# Patient Record
Sex: MALE | Race: ASIAN | NOT HISPANIC OR LATINO | URBAN - METROPOLITAN AREA
[De-identification: names, ages, dates, MRNs, and addresses within clinical notes are randomized per-mention and may not be internally consistent; named-entity substitution may affect disease eponyms.]

---

## 2018-02-12 ENCOUNTER — INPATIENT (INPATIENT)
Facility: HOSPITAL | Age: 43
LOS: 2 days | Discharge: HOME | DRG: 866 | End: 2018-02-15
Attending: HOSPITALIST | Admitting: HOSPITALIST
Payer: COMMERCIAL

## 2018-02-12 VITALS
RESPIRATION RATE: 18 BRPM | OXYGEN SATURATION: 100 % | HEART RATE: 118 BPM | DIASTOLIC BLOOD PRESSURE: 79 MMHG | SYSTOLIC BLOOD PRESSURE: 117 MMHG | TEMPERATURE: 100 F

## 2018-02-12 DIAGNOSIS — D69.6 THROMBOCYTOPENIA, UNSPECIFIED: ICD-10-CM

## 2018-02-12 DIAGNOSIS — R11.2 NAUSEA WITH VOMITING, UNSPECIFIED: ICD-10-CM

## 2018-02-12 DIAGNOSIS — R50.9 FEVER, UNSPECIFIED: ICD-10-CM

## 2018-02-12 DIAGNOSIS — Z29.9 ENCOUNTER FOR PROPHYLACTIC MEASURES, UNSPECIFIED: ICD-10-CM

## 2018-02-12 DIAGNOSIS — R79.89 OTHER SPECIFIED ABNORMAL FINDINGS OF BLOOD CHEMISTRY: ICD-10-CM

## 2018-02-12 DIAGNOSIS — R74.0 NONSPECIFIC ELEVATION OF LEVELS OF TRANSAMINASE AND LACTIC ACID DEHYDROGENASE [LDH]: ICD-10-CM

## 2018-02-12 DIAGNOSIS — E87.1 HYPO-OSMOLALITY AND HYPONATREMIA: ICD-10-CM

## 2018-02-12 DIAGNOSIS — R19.8 OTHER SPECIFIED SYMPTOMS AND SIGNS INVOLVING THE DIGESTIVE SYSTEM AND ABDOMEN: Chronic | ICD-10-CM

## 2018-02-12 DIAGNOSIS — R79.9 ABNORMAL FINDING OF BLOOD CHEMISTRY, UNSPECIFIED: ICD-10-CM

## 2018-02-12 LAB
24R-OH-CALCIDIOL SERPL-MCNC: 19.6 NG/ML — LOW (ref 30–80)
ALBUMIN SERPL ELPH-MCNC: 3.7 G/DL — SIGNIFICANT CHANGE UP (ref 3.3–5)
ALP SERPL-CCNC: 108 U/L — SIGNIFICANT CHANGE UP (ref 40–120)
ALT FLD-CCNC: 117 U/L RC — HIGH (ref 10–45)
ANION GAP SERPL CALC-SCNC: 14 MMOL/L — SIGNIFICANT CHANGE UP (ref 5–17)
ANION GAP SERPL CALC-SCNC: 17 MMOL/L — SIGNIFICANT CHANGE UP (ref 5–17)
APPEARANCE UR: CLEAR — SIGNIFICANT CHANGE UP
APTT BLD: 45.1 SEC — HIGH (ref 27.5–37.4)
AST SERPL-CCNC: 117 U/L — HIGH (ref 10–40)
BASOPHILS # BLD AUTO: 0 K/UL — SIGNIFICANT CHANGE UP (ref 0–0.2)
BASOPHILS # BLD AUTO: 0.05 K/UL — SIGNIFICANT CHANGE UP (ref 0–0.2)
BASOPHILS NFR BLD AUTO: 0.9 % — SIGNIFICANT CHANGE UP (ref 0–2)
BILIRUB SERPL-MCNC: 1.1 MG/DL — SIGNIFICANT CHANGE UP (ref 0.2–1.2)
BILIRUB UR-MCNC: NEGATIVE — SIGNIFICANT CHANGE UP
BUN SERPL-MCNC: 6 MG/DL — LOW (ref 7–23)
BUN SERPL-MCNC: 7 MG/DL — SIGNIFICANT CHANGE UP (ref 7–23)
BURR CELLS BLD QL SMEAR: SLIGHT — SIGNIFICANT CHANGE UP
CA-I BLD-SCNC: 0.97 MMOL/L — LOW (ref 1.12–1.3)
CALCIUM SERPL-MCNC: 6.9 MG/DL — LOW (ref 8.4–10.5)
CALCIUM SERPL-MCNC: 7 MG/DL — LOW (ref 8.4–10.5)
CALCIUM SERPL-MCNC: 7.9 MG/DL — LOW (ref 8.4–10.5)
CHLORIDE SERPL-SCNC: 86 MMOL/L — LOW (ref 96–108)
CHLORIDE SERPL-SCNC: 94 MMOL/L — LOW (ref 96–108)
CO2 SERPL-SCNC: 22 MMOL/L — SIGNIFICANT CHANGE UP (ref 22–31)
CO2 SERPL-SCNC: 23 MMOL/L — SIGNIFICANT CHANGE UP (ref 22–31)
COLOR SPEC: YELLOW — SIGNIFICANT CHANGE UP
CREAT SERPL-MCNC: 1.04 MG/DL — SIGNIFICANT CHANGE UP (ref 0.5–1.3)
CREAT SERPL-MCNC: 1.07 MG/DL — SIGNIFICANT CHANGE UP (ref 0.5–1.3)
D DIMER BLD IA.RAPID-MCNC: 1771 NG/ML DDU — HIGH
DIFF PNL FLD: NEGATIVE — SIGNIFICANT CHANGE UP
EOSINOPHIL # BLD AUTO: 0 K/UL — SIGNIFICANT CHANGE UP (ref 0–0.5)
EOSINOPHIL NFR BLD AUTO: 0 — SIGNIFICANT CHANGE UP
FIBRINOGEN PPP-MCNC: 445 MG/DL — SIGNIFICANT CHANGE UP (ref 310–510)
GIANT PLATELETS BLD QL SMEAR: PRESENT — SIGNIFICANT CHANGE UP
GLUCOSE SERPL-MCNC: 118 MG/DL — HIGH (ref 70–99)
GLUCOSE SERPL-MCNC: 127 MG/DL — HIGH (ref 70–99)
GLUCOSE UR QL: NEGATIVE — SIGNIFICANT CHANGE UP
HAV IGM SER-ACNC: SIGNIFICANT CHANGE UP
HBV CORE IGM SER-ACNC: SIGNIFICANT CHANGE UP
HBV SURFACE AG SER-ACNC: SIGNIFICANT CHANGE UP
HCT VFR BLD CALC: 37.4 % — LOW (ref 39–50)
HCT VFR BLD CALC: 47.8 % — SIGNIFICANT CHANGE UP (ref 39–50)
HCV AB S/CO SERPL IA: 0.08 S/CO — SIGNIFICANT CHANGE UP
HCV AB SERPL-IMP: SIGNIFICANT CHANGE UP
HGB BLD-MCNC: 13.3 G/DL — SIGNIFICANT CHANGE UP (ref 13–17)
HGB BLD-MCNC: 16.3 G/DL — SIGNIFICANT CHANGE UP (ref 13–17)
HIV 1+2 AB+HIV1 P24 AG SERPL QL IA: SIGNIFICANT CHANGE UP
INR BLD: 0.99 RATIO — SIGNIFICANT CHANGE UP (ref 0.88–1.16)
KETONES UR-MCNC: ABNORMAL
LEUKOCYTE ESTERASE UR-ACNC: NEGATIVE — SIGNIFICANT CHANGE UP
LYMPHOCYTES # BLD AUTO: 0.61 K/UL — LOW (ref 1–3.3)
LYMPHOCYTES # BLD AUTO: 1.1 K/UL — SIGNIFICANT CHANGE UP (ref 1–3.3)
LYMPHOCYTES # BLD AUTO: 10.8 % — LOW (ref 13–44)
LYMPHOCYTES # BLD AUTO: 15 % — SIGNIFICANT CHANGE UP (ref 13–44)
LYMPHOCYTES # SPEC AUTO: 4.5 % — HIGH (ref 0–0)
MAGNESIUM SERPL-MCNC: 1.6 MG/DL — SIGNIFICANT CHANGE UP (ref 1.6–2.6)
MANUAL DIF COMMENT BLD-IMP: SIGNIFICANT CHANGE UP
MANUAL SMEAR VERIFICATION: SIGNIFICANT CHANGE UP
MCHC RBC-ENTMCNC: 29.2 PG — SIGNIFICANT CHANGE UP (ref 27–34)
MCHC RBC-ENTMCNC: 29.3 PG — SIGNIFICANT CHANGE UP (ref 27–34)
MCHC RBC-ENTMCNC: 34 GM/DL — SIGNIFICANT CHANGE UP (ref 32–36)
MCHC RBC-ENTMCNC: 35.6 GM/DL — SIGNIFICANT CHANGE UP (ref 32–36)
MCV RBC AUTO: 82 FL — SIGNIFICANT CHANGE UP (ref 80–100)
MCV RBC AUTO: 86.3 FL — SIGNIFICANT CHANGE UP (ref 80–100)
MONOCYTES # BLD AUTO: 0.36 K/UL — SIGNIFICANT CHANGE UP (ref 0–0.9)
MONOCYTES # BLD AUTO: 0.8 K/UL — SIGNIFICANT CHANGE UP (ref 0–0.9)
MONOCYTES NFR BLD AUTO: 12 % — SIGNIFICANT CHANGE UP (ref 2–14)
MONOCYTES NFR BLD AUTO: 6.3 % — SIGNIFICANT CHANGE UP (ref 2–14)
NEUTROPHILS # BLD AUTO: 2.5 K/UL — SIGNIFICANT CHANGE UP (ref 1.8–7.4)
NEUTROPHILS # BLD AUTO: 4.4 K/UL — SIGNIFICANT CHANGE UP (ref 1.8–7.4)
NEUTROPHILS NFR BLD AUTO: 35 % — LOW (ref 43–77)
NEUTROPHILS NFR BLD AUTO: 55.9 % — SIGNIFICANT CHANGE UP (ref 43–77)
NEUTS BAND # BLD: 22 % — HIGH (ref 0–8)
NITRITE UR-MCNC: NEGATIVE — SIGNIFICANT CHANGE UP
PH UR: 6.5 — SIGNIFICANT CHANGE UP (ref 5–8)
PHOSPHATE SERPL-MCNC: 1.4 MG/DL — LOW (ref 2.5–4.5)
PLAT MORPH BLD: NORMAL — SIGNIFICANT CHANGE UP
PLATELET # BLD AUTO: 40 K/UL — LOW (ref 150–400)
PLATELET # BLD AUTO: 45 K/UL — LOW (ref 150–400)
POTASSIUM SERPL-MCNC: 3.4 MMOL/L — LOW (ref 3.5–5.3)
POTASSIUM SERPL-MCNC: 3.5 MMOL/L — SIGNIFICANT CHANGE UP (ref 3.5–5.3)
POTASSIUM SERPL-SCNC: 3.4 MMOL/L — LOW (ref 3.5–5.3)
POTASSIUM SERPL-SCNC: 3.5 MMOL/L — SIGNIFICANT CHANGE UP (ref 3.5–5.3)
PROT SERPL-MCNC: 7.1 G/DL — SIGNIFICANT CHANGE UP (ref 6–8.3)
PROT UR-MCNC: 100 MG/DL
PROTHROM AB SERPL-ACNC: 10.7 SEC — SIGNIFICANT CHANGE UP (ref 9.8–12.7)
PTH-INTACT FLD-MCNC: 158 PG/ML — HIGH (ref 15–65)
RAPID RVP RESULT: SIGNIFICANT CHANGE UP
RBC # BLD: 4.56 M/UL — SIGNIFICANT CHANGE UP (ref 4.2–5.8)
RBC # BLD: 5.54 M/UL — SIGNIFICANT CHANGE UP (ref 4.2–5.8)
RBC # FLD: 11.5 % — SIGNIFICANT CHANGE UP (ref 10.3–14.5)
RBC # FLD: 12.5 % — SIGNIFICANT CHANGE UP (ref 10.3–14.5)
RBC BLD AUTO: SIGNIFICANT CHANGE UP
RBC CASTS # UR COMP ASSIST: SIGNIFICANT CHANGE UP /HPF (ref 0–2)
SMUDGE CELLS # BLD: PRESENT — SIGNIFICANT CHANGE UP
SODIUM SERPL-SCNC: 126 MMOL/L — LOW (ref 135–145)
SODIUM SERPL-SCNC: 130 MMOL/L — LOW (ref 135–145)
SP GR SPEC: 1.01 — LOW (ref 1.01–1.02)
UROBILINOGEN FLD QL: NEGATIVE — SIGNIFICANT CHANGE UP
VIT D25+D1,25 OH+D1,25 PNL SERPL-MCNC: 91.3 PG/ML — HIGH (ref 19.9–79.3)
WBC # BLD: 5.68 K/UL — SIGNIFICANT CHANGE UP (ref 3.8–10.5)
WBC # BLD: 7 K/UL — SIGNIFICANT CHANGE UP (ref 3.8–10.5)
WBC # FLD AUTO: 5.68 K/UL — SIGNIFICANT CHANGE UP (ref 3.8–10.5)
WBC # FLD AUTO: 7 K/UL — SIGNIFICANT CHANGE UP (ref 3.8–10.5)
WBC UR QL: SIGNIFICANT CHANGE UP /HPF (ref 0–5)

## 2018-02-12 PROCEDURE — 88291 CYTO/MOLECULAR REPORT: CPT | Mod: 59

## 2018-02-12 PROCEDURE — 71046 X-RAY EXAM CHEST 2 VIEWS: CPT | Mod: 26

## 2018-02-12 PROCEDURE — 76700 US EXAM ABDOM COMPLETE: CPT | Mod: 26

## 2018-02-12 PROCEDURE — 99284 EMERGENCY DEPT VISIT MOD MDM: CPT | Mod: 25

## 2018-02-12 PROCEDURE — 12345: CPT | Mod: NC

## 2018-02-12 PROCEDURE — 88189 FLOWCYTOMETRY/READ 16 & >: CPT

## 2018-02-12 PROCEDURE — 99223 1ST HOSP IP/OBS HIGH 75: CPT | Mod: GC

## 2018-02-12 PROCEDURE — G0452: CPT | Mod: 26

## 2018-02-12 PROCEDURE — 99222 1ST HOSP IP/OBS MODERATE 55: CPT | Mod: GC

## 2018-02-12 PROCEDURE — 93010 ELECTROCARDIOGRAM REPORT: CPT | Mod: NC

## 2018-02-12 RX ORDER — SODIUM CHLORIDE 9 MG/ML
2000 INJECTION INTRAMUSCULAR; INTRAVENOUS; SUBCUTANEOUS ONCE
Qty: 0 | Refills: 0 | Status: COMPLETED | OUTPATIENT
Start: 2018-02-12 | End: 2018-02-12

## 2018-02-12 RX ORDER — POTASSIUM CHLORIDE 20 MEQ
40 PACKET (EA) ORAL ONCE
Qty: 0 | Refills: 0 | Status: COMPLETED | OUTPATIENT
Start: 2018-02-12 | End: 2018-02-12

## 2018-02-12 RX ORDER — ONDANSETRON 8 MG/1
4 TABLET, FILM COATED ORAL EVERY 8 HOURS
Qty: 0 | Refills: 0 | Status: DISCONTINUED | OUTPATIENT
Start: 2018-02-12 | End: 2018-02-15

## 2018-02-12 RX ORDER — ONDANSETRON 8 MG/1
4 TABLET, FILM COATED ORAL ONCE
Qty: 0 | Refills: 0 | Status: COMPLETED | OUTPATIENT
Start: 2018-02-12 | End: 2018-02-12

## 2018-02-12 RX ORDER — ACETAMINOPHEN 500 MG
975 TABLET ORAL ONCE
Qty: 0 | Refills: 0 | Status: COMPLETED | OUTPATIENT
Start: 2018-02-12 | End: 2018-02-12

## 2018-02-12 RX ORDER — SODIUM CHLORIDE 9 MG/ML
1000 INJECTION INTRAMUSCULAR; INTRAVENOUS; SUBCUTANEOUS
Qty: 0 | Refills: 0 | Status: DISCONTINUED | OUTPATIENT
Start: 2018-02-12 | End: 2018-02-14

## 2018-02-12 RX ORDER — INFLUENZA VIRUS VACCINE 15; 15; 15; 15 UG/.5ML; UG/.5ML; UG/.5ML; UG/.5ML
0.5 SUSPENSION INTRAMUSCULAR ONCE
Qty: 0 | Refills: 0 | Status: DISCONTINUED | OUTPATIENT
Start: 2018-02-12 | End: 2018-02-15

## 2018-02-12 RX ADMIN — SODIUM CHLORIDE 100 MILLILITER(S): 9 INJECTION INTRAMUSCULAR; INTRAVENOUS; SUBCUTANEOUS at 14:48

## 2018-02-12 RX ADMIN — ONDANSETRON 4 MILLIGRAM(S): 8 TABLET, FILM COATED ORAL at 01:47

## 2018-02-12 RX ADMIN — Medication 975 MILLIGRAM(S): at 01:47

## 2018-02-12 RX ADMIN — Medication 40 MILLIEQUIVALENT(S): at 03:53

## 2018-02-12 RX ADMIN — SODIUM CHLORIDE 100 MILLILITER(S): 9 INJECTION INTRAMUSCULAR; INTRAVENOUS; SUBCUTANEOUS at 04:42

## 2018-02-12 RX ADMIN — SODIUM CHLORIDE 100 MILLILITER(S): 9 INJECTION INTRAMUSCULAR; INTRAVENOUS; SUBCUTANEOUS at 11:19

## 2018-02-12 RX ADMIN — SODIUM CHLORIDE 2000 MILLILITER(S): 9 INJECTION INTRAMUSCULAR; INTRAVENOUS; SUBCUTANEOUS at 01:46

## 2018-02-12 RX ADMIN — Medication 975 MILLIGRAM(S): at 03:53

## 2018-02-12 NOTE — ED PROVIDER NOTE - OBJECTIVE STATEMENT
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  Reports that Wednesday he began feeling tired, cold, subjective fever.  Friday noted increased fever took temp 103-104, felt myalgias.  Went to urgent care and was told he had the flu, denies swab, denies Tamiflu.  Reports yesterday had about 12 nonbilious emetic episodes, gastric content, after multiple emetic episodes noted small streaks of blood in emesis.  Today has had 3-4 emetic episodes, nonbloody, nonbilious.  Reports took antiemetic which improved symptoms for a few hours but now return of symptoms.  Denies abdominal pain, diarrhea, blood in stools. Denies dysuria, hematuria, change in urinary habits including frequency, urgency. Denies chest pain, shortness of breath, palpitations. Denies sick contacts.  Denies recent international travel, recent trip [facility].  Social EtoH, denies tobacco, denies drugs.  Denies allergies.  Last Motrin 2pm, last Tylenol 5pm (total 2 pills all day).  On exam, NAD, head NCAT, PERRL, nonicteric, FROM at neck, no tenderness to palpation or stepoffs along length of spine, lungs CTAB with good inspiratory effort, +S1S2, no m/r/g, abdomen soft with +BS, NT, ND, no CVAT, moving all extremities with 5/5 strength bilateral upper and lower extremities, good and equal  strength bilaterally, no rash; A/P: 42M with repeated emesis and recent flu like symptoms, ddx includes viral illness, gastroenteritis, will also look for post viral PNA, will give antiemetic, IVFs, check labs, CXR, UA, reassess

## 2018-02-12 NOTE — H&P ADULT - NSHPPHYSICALEXAM_GEN_ALL_CORE
PHYSICAL EXAM:  Vital Signs Last 24 Hrs  T(C): 37.2 (12 Feb 2018 01:13), Max: 37.7 (12 Feb 2018 00:05)  T(F): 99 (12 Feb 2018 01:13), Max: 99.9 (12 Feb 2018 00:05)  HR: 101 (12 Feb 2018 01:13) (101 - 118)  BP: 131/88 (12 Feb 2018 01:13) (117/79 - 131/88)  RR: 16 (12 Feb 2018 01:13) (16 - 18)  SpO2: 100% (12 Feb 2018 01:13) (100% - 100%)  GENERAL: NAD, well-developed  HEAD: Atraumatic, Normocephalic  EYES: EOMI, PERRLA, conjunctiva and sclera clear  NECK: Supple, No JVD  CHEST/LUNG: Clear to auscultation bilaterally; No wheezes/rales/rhonchi  HEART: Regular rate and rhythm; No murmurs, rubs, or gallops  ABDOMEN: Soft, Nontender, Nondistended; Bowel sounds present  EXTREMITIES:  2+ dP pulses b/l, No clubbing, cyanosis, or edema  PSYCH: reactive affect  NEUROLOGY: AAOx3, non-focal  SKIN: No rashes or lesions PHYSICAL EXAM:  Vital Signs Last 24 Hrs  T(C): 37.2 (12 Feb 2018 01:13), Max: 37.7 (12 Feb 2018 00:05)  T(F): 99 (12 Feb 2018 01:13), Max: 99.9 (12 Feb 2018 00:05)  HR: 101 (12 Feb 2018 01:13) (101 - 118)  BP: 131/88 (12 Feb 2018 01:13) (117/79 - 131/88)  RR: 16 (12 Feb 2018 01:13) (16 - 18)  SpO2: 100% (12 Feb 2018 01:13) (100% - 100%)  GENERAL: NAD, well-developed  HEAD: Atraumatic, Normocephalic  EYES: EOMI, PERRLA, conjunctiva and sclera clear  NECK: Supple  CHEST/LUNG: Clear to auscultation bilaterally; No wheezes/rales/rhonchi  HEART: Regular rate and rhythm; No murmurs, rubs, or gallops  ABDOMEN: Soft, Nontender, Nondistended; Bowel sounds present  EXTREMITIES: 2+ dP pulses b/l, No clubbing, cyanosis, or edema  PSYCH: reactive affect  NEUROLOGY: AAOx3, non-focal  SKIN: No rashes or lesions

## 2018-02-12 NOTE — H&P ADULT - ASSESSMENT
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

## 2018-02-12 NOTE — H&P ADULT - HISTORY OF PRESENT ILLNESS
Patient is a 43 yo M w/no significant PMH who presented with c/o nausea/vomiting.    In the ED, VS on presentation: T 99.9, , /79, RR 18, SpO2 100% on RA  CBC revealed normal white count, however noted 33% bands and 5% blasts. Thrombocytopenia to 45 also noted. CMP noted hyponatremia to 126, hypokalemia to 3.4, hypocalcemia to 7.9 and transaminitis with AST/ALT of 117/117. UA notable for 100 protein and trace ketones. RVP negative. CXR clear lungs. Blood and urine cultures were sent. He was given 2L NS bolus, Tylenol 975 x 1, Zofran 4mg x 1 and KCl 40mEq x 1. Patient is a 43 yo M w/no significant PMH who presented with c/o nausea/vomiting. The patient reports that starting on Wednesday, he developed fatigue    In the ED, VS on presentation: T 99.9, , /79, RR 18, SpO2 100% on RA  CBC revealed normal white count, however noted 33% bands and 5% blasts. Thrombocytopenia to 45 also noted. CMP noted hyponatremia to 126, hypokalemia to 3.4, hypocalcemia to 7.9 and transaminitis with AST/ALT of 117/117. UA notable for 100 protein and trace ketones. RVP negative. CXR clear lungs. Blood and urine cultures were sent. He was given 2L NS bolus, Tylenol 975 x 1, Zofran 4mg x 1 and KCl 40mEq x 1. 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 The patient reports that starting on Wednesday, he developed fatigue/generalized malaise and chills. He reports that, then on Friday, he took his temperature and it was 103. He went to an urgent care and was told he likely had flu (was not swabbed) and to treat symptomatically as he was at the end of the window for Tamiflu. On Saturday and Sunday he had worsening symptoms of nausea and vomiting, NBNB, approximately 12 episodes. He reports a complete inability to tolerate PO at the time. He states he called his friend who is a gastroenterologist, who prescribed him anti-emetic medication (likely Zofran). He took it and felt better for a few hours, but his symptoms returned, so he presented to the ED. He denies diarrhea, abdominal pain, chest pain, shortness of breath, nasal congestion, sore throat, hematuria, hematochezia, melena. 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 He denies toxic habits. He was born in [place]. Recent travel only to [place] 1 week ago with family. Denies sick contacts.    In the ED, VS on presentation: T 99.9, , /79, RR 18, SpO2 100% on RA  CBC revealed normal white count, however noted 33% bands and 5% blasts. Thrombocytopenia to 45 also noted. CMP noted hyponatremia to 126, hypokalemia to 3.4, hypocalcemia to 7.9 and transaminitis with AST/ALT of 117/117. UA notable for 100 protein and trace ketones. RVP negative. CXR clear lungs. Blood and urine cultures were sent. He was given 2L NS bolus, Tylenol 975 x 1, Zofran 4mg x 1 and KCl 40mEq x 1.

## 2018-02-12 NOTE — H&P ADULT - ATTENDING COMMENTS
I have reviewed the labs, imaging and ekg.  41 yo M w/PMH of a benign salivary gland tumor s/p resection who presented with c/o nausea/vomiting with concurrent hypokalemia and hyponatremia. He was found to have bandemia and blasts on peripheral smear in setting of thrombocytopenia. Given pattern of lab abnormalities with blasts and bands will need to do a malignancy work up. Given thrombocytopenia and possible malignancy will send a DIC work up.  -Hem consult in AM  -Peripheral smear  -Trend cbc, bmp  -F/u coags, d-dimer, fibrinogen/split products  -F/u HIV  -RUQ US, can consider additional abdominal imaging  -DVT PPx, SCDs

## 2018-02-12 NOTE — H&P ADULT - PROBLEM SELECTOR PLAN 4
Patient hyponatremic on presentation to a serum Na concentration of 126  - this is likely hypovolemic in the setting of nausea/vomiting and dehydration  - s/p 2L NS in the ED  - c/w IVF maintenance  - f/u repeat BMP

## 2018-02-12 NOTE — ED ADULT NURSE NOTE - OBJECTIVE STATEMENT
42y male with no significant pmh presents to the ER c/o vomiting since yesterday. pt is alert and oriented x 4 and speaking coherently. pt states he was dx with the flu on thursday. pt states he is in the ER today after 12 episodes of vomiting and unable to tolerate po liquids. pt c/o fevers, chills, body aches. pt in NAD. pt shivering. VSS. pending md crowley. will reassess.

## 2018-02-12 NOTE — ED PROVIDER NOTE - PROGRESS NOTE DETAILS
Attending MD Barrera: Labs reviewed and abnormalities of CBC noted, hypoNa, hypoK, LFT derangement also noted.  Discussed findings with patient, concern for malignancy.  Will admit.

## 2018-02-12 NOTE — H&P ADULT - PROBLEM SELECTOR PLAN 1
Patient presented with nausea and vomiting of unclear etiology  - RVP negative, blood and urine cultures sent  - may be related to a viral gastroenteritis - consider CT abdomen/pelvis to better evaluate etiology  - s/p 2L NS in ED and 4mg Zofran  - c/w IVF and Zofran PRN for now Patient presented with nausea and vomiting of unclear etiology in setting of severe fatigue  - RVP negative, blood and urine cultures sent  - may be related to a viral gastroenteritis although would consider CT abdomen/pelvis to better evaluate etiology  - s/p 2L NS in ED and 4mg Zofran  - c/w IVF and Zofran PRN for now

## 2018-02-12 NOTE — H&P ADULT - PROBLEM SELECTOR PLAN 2
Patient noted to have a normal WBC with 33% bandemia and 5% blasts on differential of peripheral blood, concerning for a hematologic malignancy  - will need peripheral smear to better evaluate  - hematology consult in the AM  - trend CBC with diff Patient noted to have a normal WBC with 33% bandemia and 5% blasts on differential of peripheral blood, concerning for a hematologic malignancy  - will need peripheral smear to better evaluate  -See thrombocytopenia work up.   - hematology consult in the AM  - trend CBC with diff

## 2018-02-12 NOTE — H&P ADULT - PROBLEM SELECTOR PLAN 5
Patient with transaminitis on presentation with AST//117  - this may be related to dehydration/sepsis verus other infection  - will check acute hepatitis panel, RUQ US

## 2018-02-12 NOTE — H&P ADULT - NSHPREVIEWOFSYSTEMS_GEN_ALL_CORE
Constitutional: denies fevers, chills, night sweats, weight loss  HEENT: denies visual changes, hearing changes, rhinitis, odynophagia, or dysphagia  Cardiovascular: denies palpitations, chest pain, edema  Respiratory: denies SOB, wheezing  Gastrointestinal: denies N/V/D, abdominal pain, hematochezia, melena  : denies dysuria, hematuria  MSK: denies weakness, joint pain  Neuro: no numbness or tingling  Psych: no depression or anxiety  Skin: denies new rashes or masses Constitutional: denies night sweats, weight loss +fevers, chills  HEENT: denies visual changes, hearing changes, rhinitis  Cardiovascular: denies palpitations, chest pain, edema  Respiratory: denies SOB, wheezing  Gastrointestinal: denies diarrhea, abdominal pain, hematochezia, melena +nausea and vomiting  : denies dysuria, hematuria  MSK: denies weakness, joint pain  Neuro: no numbness or tingling  Psych: no depression or anxiety  Skin: denies new rashes or masses

## 2018-02-12 NOTE — CONSULT NOTE ADULT - SUBJECTIVE AND OBJECTIVE BOX
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 The patient reports that starting on 2/7, he developed fatigue/generalized malaise and chills. He reports that, then on Friday, he took his temperature and it was 103. He went to an urgent care and was told he likely had flu (was not swabbed) and to treat symptomatically as he was at the end of the window for Tamiflu. On Saturday and Sunday he had worsening symptoms of nausea and vomiting, NBNB, approximately 12 episodes. He reports a complete inability to tolerate PO at the time. He states he called his friend who is a gastroenterologist, who prescribed him anti-emetic medication (likely Zofran). He took it and felt better for a few hours, but his symptoms returned, so he presented to the ED. He denies diarrhea, abdominal pain, chest pain, shortness of breath, nasal congestion, sore throat, hematuria, hematochezia, melena. 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 He denies toxic habits. He was born in [place]. Recent travel only to [place] 1 week ago with family. Denies sick contacts.    In the ED, VS on presentation: T 99.9, , /79, RR 18, SpO2 100% on RA  CBC revealed normal white count, however noted 33% bands and 5% blasts. Thrombocytopenia to 45 also noted. CMP noted hyponatremia to 126, hypokalemia to 3.4, hypocalcemia to 7.9 and transaminitis with AST/ALT of 117/117. UA notable for 100 protein and trace ketones. RVP negative. CXR clear lungs. Blood and urine cultures were sent. He was given 2L NS bolus, Tylenol 975 x 1, Zofran 4mg x 1 and KCl 40mEq x 1.     Allergies  No Known Allergies    MEDICATIONS  (STANDING):  influenza   Vaccine 0.5 milliLiter(s) IntraMuscular once  sodium chloride 0.9%. 1000 milliLiter(s) (100 mL/Hr) IV Continuous <Continuous>    MEDICATIONS  (PRN):  ondansetron Injectable 4 milliGRAM(s) IV Push every 8 hours PRN Nausea and/or Vomiting    PAST MEDICAL & SURGICAL HISTORY:  Salivary gland tumor  No significant past surgical history    FAMILY HISTORY:  Family history of MI (myocardial infarction) (Mother): around age 50-55    SOCIAL HISTORY: No EtOH, no tobacco    REVIEW OF SYSTEMS:  All other review of systems is negative unless indicated above.    Height (cm): 152.4 (02-12 @ 14:55)  Weight (kg): 59 (02-12 @ 14:55)  BMI (kg/m2): 25.4 (02-12 @ 14:55)  BSA (m2): 1.55 (02-12 @ 14:55)    T(F): 98.7 (02-12-18 @ 18:46), Max: 99.9 (02-12-18 @ 00:05)  HR: 94 (02-12-18 @ 18:46)  BP: 115/78 (02-12-18 @ 18:46)  RR: 18 (02-12-18 @ 18:46)  SpO2: 100% (02-12-18 @ 18:46)  Wt(kg): --    PHYSICAL EXAM:  GENERAL: NAD, well-developed  HEAD:  Atraumatic, Normocephalic  EYES: EOMI, PERRLA, conjunctiva and sclera clear  NECK: Supple, No JVD  CHEST/LUNG: Clear to auscultation bilaterally; No wheeze  HEART: Regular rate and rhythm; No murmurs, rubs, or gallops  ABDOMEN: Soft, Nontender, Nondistended; Bowel sounds present  EXTREMITIES:  2+ Peripheral Pulses, No clubbing, cyanosis, or edema  NEUROLOGY: non-focal  SKIN: No rashes or lesions                          13.3   5.68  )-----------( 40       ( 12 Feb 2018 07:38 )             37.4       02-12    130<L>  |  94<L>  |  7   ----------------------------<  118<H>  3.5   |  22  |  1.04    Ca    7.0<L>      12 Feb 2018 07:52  Phos  1.4     02-12  Mg     1.6     02-12    TPro  7.1  /  Alb  3.7  /  TBili  1.1  /  DBili  x   /  AST  117<H>  /  ALT  117<H>  /  AlkPhos  108  02-12      Magnesium, Serum: 1.6 mg/dL (02-12 @ 07:52)  Phosphorus Level, Serum: 1.4 mg/dL (02-12 @ 07:52) HPI:  41 yo M w/PMH of a benign salivary gland tumor s/p resection who presented with c/o nausea/vomiting. The patient reports that starting on 2/7, he developed fatigue/generalized malaise and chills. He reports that, then on Friday, he took his temperature and it was 103. He went to an urgent care and was told he likely had flu (was not swabbed) and to treat symptomatically as he was at the end of the window for Tamiflu. On Saturday and Sunday he had worsening symptoms of nausea and vomiting, NBNB, approximately 12 episodes. He reports a complete inability to tolerate PO at the time. He states he called his friend who is a gastroenterologist, who prescribed him anti-emetic medication (likely Zofran). He took it and felt better for a few hours, but his symptoms returned, so he presented to the ED. He denies diarrhea, abdominal pain, chest pain, shortness of breath, nasal congestion, sore throat, hematuria, hematochezia, melena. 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 He denies toxic habits. He was born in [place]. Recent travel only to [place] 1 week ago with family. Denies sick contacts.    In the ED, VS on presentation: T 99.9, , /79, RR 18, SpO2 100% on RA  CBC revealed normal white count, however noted 33% bands and 5% blasts. Thrombocytopenia to 45 also noted. CMP noted hyponatremia to 126, hypokalemia to 3.4, hypocalcemia to 7.9 and transaminitis with AST/ALT of 117/117. UA notable for 100 protein and trace ketones. RVP negative. CXR clear lungs. Blood and urine cultures were sent. He was given 2L NS bolus, Tylenol 975 x 1, Zofran 4mg x 1 and KCl 40mEq x 1.     Allergies  No Known Allergies    MEDICATIONS  (STANDING):  influenza   Vaccine 0.5 milliLiter(s) IntraMuscular once  sodium chloride 0.9%. 1000 milliLiter(s) (100 mL/Hr) IV Continuous <Continuous>    MEDICATIONS  (PRN):  ondansetron Injectable 4 milliGRAM(s) IV Push every 8 hours PRN Nausea and/or Vomiting    PAST MEDICAL & SURGICAL HISTORY:  Salivary gland tumor  No significant past surgical history    FAMILY HISTORY:  Family history of MI (myocardial infarction) (Mother): around age 50-55    SOCIAL HISTORY: No EtOH, no tobacco    REVIEW OF SYSTEMS:  All other review of systems is negative unless indicated above.    Height (cm): 152.4 (02-12 @ 14:55)  Weight (kg): 59 (02-12 @ 14:55)  BMI (kg/m2): 25.4 (02-12 @ 14:55)  BSA (m2): 1.55 (02-12 @ 14:55)    T(F): 98.7 (02-12-18 @ 18:46), Max: 99.9 (02-12-18 @ 00:05)  HR: 94 (02-12-18 @ 18:46)  BP: 115/78 (02-12-18 @ 18:46)  RR: 18 (02-12-18 @ 18:46)  SpO2: 100% (02-12-18 @ 18:46)  Wt(kg): --    PHYSICAL EXAM:  GENERAL: NAD, well-developed  HEAD:  Atraumatic, Normocephalic  EYES: EOMI, PERRLA, conjunctiva and sclera clear  NECK: Supple, No JVD  CHEST/LUNG: Clear to auscultation bilaterally; No wheeze  HEART: Regular rate and rhythm; No murmurs, rubs, or gallops  ABDOMEN: Soft, Nontender, Nondistended; Bowel sounds present  EXTREMITIES:  no LE edema  NEUROLOGY: non-focal  SKIN: No rashes or lesions  LAD: no peripheral LAD                          13.3   5.68  )-----------( 40       ( 12 Feb 2018 07:38 )             37.4       02-12    130<L>  |  94<L>  |  7   ----------------------------<  118<H>  3.5   |  22  |  1.04    Ca    7.0<L>      12 Feb 2018 07:52  Phos  1.4     02-12  Mg     1.6     02-12    TPro  7.1  /  Alb  3.7  /  TBili  1.1  /  DBili  x   /  AST  117<H>  /  ALT  117<H>  /  AlkPhos  108  02-12      Magnesium, Serum: 1.6 mg/dL (02-12 @ 07:52)  Phosphorus Level, Serum: 1.4 mg/dL (02-12 @ 07:52)

## 2018-02-12 NOTE — H&P ADULT - FAMILY HISTORY
Mother  Still living? Unknown  Family history of MI (myocardial infarction), Age at diagnosis: Age Unknown

## 2018-02-12 NOTE — H&P ADULT - PROBLEM SELECTOR PLAN 3
Patient noted to be thrombocytopenic to a platelet count of 45 on initial CBC  - this is in the setting of possible new hematologic malignancy  - will check HIV  - f/u with hematology Patient noted to be thrombocytopenic to a platelet count of 45 on initial CBC  - this is in the setting of possible new hematologic malignancy, will check DIC panel  - will check HIV  - f/u with hematology

## 2018-02-12 NOTE — H&P ADULT - NSHPLABSRESULTS_GEN_ALL_CORE
Personally reviewed labs.   Personally reviewed imaging. CXR clear lungs.  Personally reviewed EKG.                           16.3   7.0   )-----------( 45       ( 2018 02:00 )             47.8           126<L>  |  86<L>  |  6<L>  ----------------------------<  127<H>  3.4<L>   |  23  |  1.07    Ca    7.9<L>      2018 02:00    TPro  7.1  /  Alb  3.7  /  TBili  1.1  /  DBili  x   /  AST  117<H>  /  ALT  117<H>  /  AlkPhos  108  12      LIVER FUNCTIONS - ( 2018 02:00 )  Alb: 3.7 g/dL / Pro: 7.1 g/dL / ALK PHOS: 108 U/L / ALT: 117 U/L RC / AST: 117 U/L / GGT: x           Urinalysis Basic - ( 2018 03:20 )    Color: Yellow / Appearance: Clear / S.007 / pH: x  Gluc: x / Ketone: Trace  / Bili: Negative / Urobili: Negative   Blood: x / Protein: 100 mg/dL / Nitrite: Negative   Leuk Esterase: Negative / RBC: 0-2 /HPF / WBC 3-5 /HPF   Sq Epi: x / Non Sq Epi: x / Bacteria: x Personally reviewed labs.   Personally reviewed imaging. CXR clear lungs.  Personally reviewed EKG. NSR at 89bpm.                          16.3   7.0   )-----------( 45       ( 2018 02:00 )             47.8       02    126<L>  |  86<L>  |  6<L>  ----------------------------<  127<H>  3.4<L>   |  23  |  1.07    Ca    7.9<L>      2018 02:00    TPro  7.1  /  Alb  3.7  /  TBili  1.1  /  DBili  x   /  AST  117<H>  /  ALT  117<H>  /  AlkPhos  108  12      LIVER FUNCTIONS - ( 2018 02:00 )  Alb: 3.7 g/dL / Pro: 7.1 g/dL / ALK PHOS: 108 U/L / ALT: 117 U/L RC / AST: 117 U/L / GGT: x           Urinalysis Basic - ( 2018 03:20 )    Color: Yellow / Appearance: Clear / S.007 / pH: x  Gluc: x / Ketone: Trace  / Bili: Negative / Urobili: Negative   Blood: x / Protein: 100 mg/dL / Nitrite: Negative   Leuk Esterase: Negative / RBC: 0-2 /HPF / WBC 3-5 /HPF   Sq Epi: x / Non Sq Epi: x / Bacteria: x Personally reviewed labs.   Personally reviewed imaging. CXR clear lungs.  Personally reviewed EKG. NSR at 89bpm.                          16.3   7.0   )-----------( 45       ( 2018 02:00 )             47.8   Auto Neutrophil # : 2.5 K/uL  Auto Lymphocyte # : 1.1 K/uL  Auto Monocyte # : 0.8 K/uL  Auto Eosinophil # : x  Auto Basophil # : 0.0 K/uL  Auto Neutrophil % : 35.0 %  Auto Lymphocyte % : 15.0 %  Auto Monocyte % : 12.0 %  Auto Eosinophil % : x  Auto Basophil % : x  Bands 33%  Blasts 5%             02    126<L>  |  86<L>  |  6<L>  ----------------------------<  127<H>  3.4<L>   |  23  |  1.07    Ca    7.9<L>      2018 02:00    TPro  7.1  /  Alb  3.7  /  TBili  1.1  /  DBili  x   /  AST  117<H>  /  ALT  117<H>  /  AlkPhos  108  02-12      LIVER FUNCTIONS - ( 2018 02:00 )  Alb: 3.7 g/dL / Pro: 7.1 g/dL / ALK PHOS: 108 U/L / ALT: 117 U/L RC / AST: 117 U/L / GGT: x           Urinalysis Basic - ( 2018 03:20 )    Color: Yellow / Appearance: Clear / S.007 / pH: x  Gluc: x / Ketone: Trace  / Bili: Negative / Urobili: Negative   Blood: x / Protein: 100 mg/dL / Nitrite: Negative   Leuk Esterase: Negative / RBC: 0-2 /HPF / WBC 3-5 /HPF   Sq Epi: x / Non Sq Epi: x / Bacteria: x

## 2018-02-12 NOTE — H&P ADULT - NSHPSOCIALHISTORY_GEN_ALL_CORE
He is a never smoker, denies EtOH use, no recreational drug use. 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 Lives with his wife and two children.

## 2018-02-12 NOTE — ED ADULT NURSE REASSESSMENT NOTE - NS ED NURSE REASSESS COMMENT FT1
Report received from Alexandra. Pt AAOx4, NAD, resp nonlabored, skin warm/dry, resting comfortably in bed with family at bedside. Pt denies headache, dizziness, chest pain, palpitations, SOB, abd pain, n/v/d, urinary symptoms, fevers, chills, weakness at this time. Pt awaiting . Safety maintained.

## 2018-02-12 NOTE — CONSULT NOTE ADULT - PROBLEM SELECTOR RECOMMENDATION 9
Patient found to have 5% blasts and 33% bands with normal wbc count concerning for underlying leukemia though can be due to viral process.  Peripheral smear is lymphoid predominant with flow cytometry showing predominant t cells with decreased CD4:8 ratio, few polyclonal B cells, no aberrancy found in the cells, neg 34 and 117 (no blasts).  Awaiting FISH studies for ALL, AML, TCR rearrangement.  Please check SELWYN, HIV, hepatitis panel, LDH and CBC with diff daily.  Also recommend CT C/A/P with contrast for to r/o malignant process and LAD.  May consider BM biopsy if above is negative or counts are persistently low.    Jennifer Marcos  Hematology Fellow  [phone] Patient found to have 5% blasts and 33% bands with normal wbc count concerning for underlying leukemia though can be due to viral process.  Peripheral smear is lymphoid predominant with flow cytometry showing predominant t cells with decreased CD4:8 ratio, few polyclonal B cells, no aberrancy found in the cells, neg 34 and 117 (no blasts).  Awaiting FISH studies for ALL, Tcell LGL and TCR rearrangement.  Please check SELWYN, HIV, hepatitis panel, LDH and CBC with diff daily.  Also recommend CT C/A/P with contrast for to r/o malignant process and LAD.  May consider BM biopsy if above is negative or counts are persistently low.    Jennifer Marcos  Hematology Fellow  [phone]

## 2018-02-13 ENCOUNTER — RESULT REVIEW (OUTPATIENT)
Age: 43
End: 2018-02-13

## 2018-02-13 LAB
ALBUMIN SERPL ELPH-MCNC: 2.8 G/DL — LOW (ref 3.3–5)
ALP SERPL-CCNC: 93 U/L — SIGNIFICANT CHANGE UP (ref 40–120)
ALT FLD-CCNC: 118 U/L — HIGH (ref 10–45)
ANION GAP SERPL CALC-SCNC: 13 MMOL/L — SIGNIFICANT CHANGE UP (ref 5–17)
AST SERPL-CCNC: 162 U/L — HIGH (ref 10–40)
BILIRUB SERPL-MCNC: 0.5 MG/DL — SIGNIFICANT CHANGE UP (ref 0.2–1.2)
BUN SERPL-MCNC: 8 MG/DL — SIGNIFICANT CHANGE UP (ref 7–23)
CALCIUM SERPL-MCNC: 7.6 MG/DL — LOW (ref 8.4–10.5)
CHLORIDE SERPL-SCNC: 105 MMOL/L — SIGNIFICANT CHANGE UP (ref 96–108)
CO2 SERPL-SCNC: 23 MMOL/L — SIGNIFICANT CHANGE UP (ref 22–31)
CREAT SERPL-MCNC: 0.85 MG/DL — SIGNIFICANT CHANGE UP (ref 0.5–1.3)
CULTURE RESULTS: NO GROWTH — SIGNIFICANT CHANGE UP
GLUCOSE SERPL-MCNC: 96 MG/DL — SIGNIFICANT CHANGE UP (ref 70–99)
HCT VFR BLD CALC: 38.1 % — LOW (ref 39–50)
HGB BLD-MCNC: 13.1 G/DL — SIGNIFICANT CHANGE UP (ref 13–17)
LDH SERPL L TO P-CCNC: 689 U/L — HIGH (ref 50–242)
MCHC RBC-ENTMCNC: 28.5 PG — SIGNIFICANT CHANGE UP (ref 27–34)
MCHC RBC-ENTMCNC: 34.4 GM/DL — SIGNIFICANT CHANGE UP (ref 32–36)
MCV RBC AUTO: 83 FL — SIGNIFICANT CHANGE UP (ref 80–100)
PLATELET # BLD AUTO: 45 K/UL — LOW (ref 150–400)
POTASSIUM SERPL-MCNC: 3.6 MMOL/L — SIGNIFICANT CHANGE UP (ref 3.5–5.3)
POTASSIUM SERPL-SCNC: 3.6 MMOL/L — SIGNIFICANT CHANGE UP (ref 3.5–5.3)
PROT SERPL-MCNC: 5.5 G/DL — LOW (ref 6–8.3)
RBC # BLD: 4.59 M/UL — SIGNIFICANT CHANGE UP (ref 4.2–5.8)
RBC # FLD: 12.8 % — SIGNIFICANT CHANGE UP (ref 10.3–14.5)
SODIUM SERPL-SCNC: 141 MMOL/L — SIGNIFICANT CHANGE UP (ref 135–145)
SPECIMEN SOURCE: SIGNIFICANT CHANGE UP
TM INTERPRETATION: SIGNIFICANT CHANGE UP
WBC # BLD: 7.96 K/UL — SIGNIFICANT CHANGE UP (ref 3.8–10.5)
WBC # FLD AUTO: 7.96 K/UL — SIGNIFICANT CHANGE UP (ref 3.8–10.5)

## 2018-02-13 PROCEDURE — 85060 BLOOD SMEAR INTERPRETATION: CPT

## 2018-02-13 PROCEDURE — 88291 CYTO/MOLECULAR REPORT: CPT | Mod: 59

## 2018-02-13 PROCEDURE — G0452: CPT | Mod: 26

## 2018-02-13 PROCEDURE — 85097 BONE MARROW INTERPRETATION: CPT

## 2018-02-13 PROCEDURE — 38221 DX BONE MARROW BIOPSIES: CPT | Mod: GC

## 2018-02-13 PROCEDURE — 88342 IMHCHEM/IMCYTCHM 1ST ANTB: CPT | Mod: 26,59

## 2018-02-13 PROCEDURE — 88360 TUMOR IMMUNOHISTOCHEM/MANUAL: CPT | Mod: 26

## 2018-02-13 PROCEDURE — 88189 FLOWCYTOMETRY/READ 16 & >: CPT | Mod: 59

## 2018-02-13 PROCEDURE — 99233 SBSQ HOSP IP/OBS HIGH 50: CPT

## 2018-02-13 PROCEDURE — 71260 CT THORAX DX C+: CPT | Mod: 26

## 2018-02-13 PROCEDURE — 88313 SPECIAL STAINS GROUP 2: CPT | Mod: 26

## 2018-02-13 PROCEDURE — 88341 IMHCHEM/IMCYTCHM EA ADD ANTB: CPT | Mod: 26,59

## 2018-02-13 PROCEDURE — 74177 CT ABD & PELVIS W/CONTRAST: CPT | Mod: 26

## 2018-02-13 PROCEDURE — 88305 TISSUE EXAM BY PATHOLOGIST: CPT | Mod: 26

## 2018-02-13 PROCEDURE — 88312 SPECIAL STAINS GROUP 1: CPT | Mod: 26

## 2018-02-13 PROCEDURE — 99232 SBSQ HOSP IP/OBS MODERATE 35: CPT | Mod: GC

## 2018-02-13 RX ORDER — POTASSIUM PHOSPHATE, MONOBASIC POTASSIUM PHOSPHATE, DIBASIC 236; 224 MG/ML; MG/ML
15 INJECTION, SOLUTION INTRAVENOUS ONCE
Qty: 0 | Refills: 0 | Status: COMPLETED | OUTPATIENT
Start: 2018-02-13 | End: 2018-02-13

## 2018-02-13 RX ORDER — LIDOCAINE HCL 20 MG/ML
30 VIAL (ML) INJECTION ONCE
Qty: 0 | Refills: 0 | Status: COMPLETED | OUTPATIENT
Start: 2018-02-13 | End: 2018-02-13

## 2018-02-13 RX ADMIN — POTASSIUM PHOSPHATE, MONOBASIC POTASSIUM PHOSPHATE, DIBASIC 62.5 MILLIMOLE(S): 236; 224 INJECTION, SOLUTION INTRAVENOUS at 18:21

## 2018-02-13 RX ADMIN — Medication 30 MILLILITER(S): at 13:29

## 2018-02-13 NOTE — PROGRESS NOTE ADULT - PROBLEM SELECTOR PLAN 6
SCDs in the setting of thrombocytopenia  Clear liquid diet, advance as tolerated
SCDs in the setting of thrombocytopenia  regular diet

## 2018-02-13 NOTE — PROCEDURE NOTE - PROCEDURE
<<-----Click on this checkbox to enter Procedure Bone marrow aspirate &biopsy  02/13/2018    Active  [id number]

## 2018-02-14 LAB
ALBUMIN SERPL ELPH-MCNC: 2.9 G/DL — LOW (ref 3.3–5)
ALP SERPL-CCNC: 109 U/L — SIGNIFICANT CHANGE UP (ref 40–120)
ALT FLD-CCNC: 285 U/L — HIGH (ref 10–45)
ANA TITR SER: NEGATIVE — SIGNIFICANT CHANGE UP
ANION GAP SERPL CALC-SCNC: 12 MMOL/L — SIGNIFICANT CHANGE UP (ref 5–17)
ANION GAP SERPL CALC-SCNC: 9 MMOL/L — SIGNIFICANT CHANGE UP (ref 5–17)
AST SERPL-CCNC: 348 U/L — HIGH (ref 10–40)
BASOPHILS # BLD AUTO: 0.15 K/UL — SIGNIFICANT CHANGE UP (ref 0–0.2)
BASOPHILS # BLD AUTO: 0.2 K/UL — SIGNIFICANT CHANGE UP (ref 0–0.2)
BASOPHILS NFR BLD AUTO: 1.9 % — SIGNIFICANT CHANGE UP (ref 0–2)
BASOPHILS NFR BLD AUTO: 2 % — SIGNIFICANT CHANGE UP (ref 0–2)
BILIRUB SERPL-MCNC: 0.4 MG/DL — SIGNIFICANT CHANGE UP (ref 0.2–1.2)
BUN SERPL-MCNC: 10 MG/DL — SIGNIFICANT CHANGE UP (ref 7–23)
BUN SERPL-MCNC: 13 MG/DL — SIGNIFICANT CHANGE UP (ref 7–23)
CALCIUM SERPL-MCNC: 8 MG/DL — LOW (ref 8.4–10.5)
CALCIUM SERPL-MCNC: 8.6 MG/DL — SIGNIFICANT CHANGE UP (ref 8.4–10.5)
CHLORIDE SERPL-SCNC: 103 MMOL/L — SIGNIFICANT CHANGE UP (ref 96–108)
CHLORIDE SERPL-SCNC: 105 MMOL/L — SIGNIFICANT CHANGE UP (ref 96–108)
CO2 SERPL-SCNC: 23 MMOL/L — SIGNIFICANT CHANGE UP (ref 22–31)
CO2 SERPL-SCNC: 31 MMOL/L — SIGNIFICANT CHANGE UP (ref 22–31)
CREAT SERPL-MCNC: 1.08 MG/DL — SIGNIFICANT CHANGE UP (ref 0.5–1.3)
CREAT SERPL-MCNC: 1.23 MG/DL — SIGNIFICANT CHANGE UP (ref 0.5–1.3)
EOSINOPHIL # BLD AUTO: 0 K/UL — SIGNIFICANT CHANGE UP (ref 0–0.5)
EOSINOPHIL # BLD AUTO: 0 K/UL — SIGNIFICANT CHANGE UP (ref 0–0.5)
EOSINOPHIL # BLD AUTO: 0.1 K/UL — SIGNIFICANT CHANGE UP (ref 0–0.5)
EOSINOPHIL NFR BLD AUTO: 0 % — SIGNIFICANT CHANGE UP (ref 0–6)
EOSINOPHIL NFR BLD AUTO: 0 — SIGNIFICANT CHANGE UP
EOSINOPHIL NFR BLD AUTO: 1 % — SIGNIFICANT CHANGE UP (ref 0–6)
GLUCOSE SERPL-MCNC: 122 MG/DL — HIGH (ref 70–99)
GLUCOSE SERPL-MCNC: 88 MG/DL — SIGNIFICANT CHANGE UP (ref 70–99)
HCT VFR BLD CALC: 36.4 % — LOW (ref 39–50)
HCT VFR BLD CALC: 43 % — SIGNIFICANT CHANGE UP (ref 39–50)
HGB BLD-MCNC: 12.2 G/DL — LOW (ref 13–17)
HGB BLD-MCNC: 14.3 G/DL — SIGNIFICANT CHANGE UP (ref 13–17)
LYMPHOCYTES # BLD AUTO: 1.8 K/UL — SIGNIFICANT CHANGE UP (ref 1–3.3)
LYMPHOCYTES # BLD AUTO: 22.6 % — SIGNIFICANT CHANGE UP (ref 13–44)
LYMPHOCYTES # BLD AUTO: 43 % — SIGNIFICANT CHANGE UP (ref 13–44)
LYMPHOCYTES # BLD AUTO: 5.53 K/UL — HIGH (ref 1–3.3)
LYMPHOCYTES # BLD AUTO: 5.8 K/UL — HIGH (ref 1–3.3)
LYMPHOCYTES # BLD AUTO: 56 % — HIGH (ref 13–44)
MANUAL SMEAR VERIFICATION: SIGNIFICANT CHANGE UP
MCHC RBC-ENTMCNC: 28.4 PG — SIGNIFICANT CHANGE UP (ref 27–34)
MCHC RBC-ENTMCNC: 29.2 PG — SIGNIFICANT CHANGE UP (ref 27–34)
MCHC RBC-ENTMCNC: 33.3 GM/DL — SIGNIFICANT CHANGE UP (ref 32–36)
MCHC RBC-ENTMCNC: 33.5 GM/DL — SIGNIFICANT CHANGE UP (ref 32–36)
MCV RBC AUTO: 84.8 FL — SIGNIFICANT CHANGE UP (ref 80–100)
MCV RBC AUTO: 87.5 FL — SIGNIFICANT CHANGE UP (ref 80–100)
MONOCYTES # BLD AUTO: 0.83 K/UL — SIGNIFICANT CHANGE UP (ref 0–0.9)
MONOCYTES # BLD AUTO: 1.38 K/UL — HIGH (ref 0–0.9)
MONOCYTES # BLD AUTO: 1.7 K/UL — HIGH (ref 0–0.9)
MONOCYTES NFR BLD AUTO: 10.4 % — SIGNIFICANT CHANGE UP (ref 2–14)
MONOCYTES NFR BLD AUTO: 14 % — SIGNIFICANT CHANGE UP (ref 2–14)
MONOCYTES NFR BLD AUTO: 22 % — HIGH (ref 2–14)
NEUTROPHILS # BLD AUTO: 1.58 K/UL — LOW (ref 1.8–7.4)
NEUTROPHILS # BLD AUTO: 2.2 K/UL — SIGNIFICANT CHANGE UP (ref 1.8–7.4)
NEUTROPHILS # BLD AUTO: 2.32 K/UL — SIGNIFICANT CHANGE UP (ref 1.8–7.4)
NEUTROPHILS NFR BLD AUTO: 16 % — LOW (ref 43–77)
NEUTROPHILS NFR BLD AUTO: 21.7 % — LOW (ref 43–77)
NEUTROPHILS NFR BLD AUTO: 23 % — LOW (ref 43–77)
PLAT MORPH BLD: NORMAL — SIGNIFICANT CHANGE UP
PLAT MORPH BLD: NORMAL — SIGNIFICANT CHANGE UP
PLATELET # BLD AUTO: 54 K/UL — LOW (ref 150–400)
PLATELET # BLD AUTO: 70 K/UL — LOW (ref 150–400)
POTASSIUM SERPL-MCNC: 3.5 MMOL/L — SIGNIFICANT CHANGE UP (ref 3.5–5.3)
POTASSIUM SERPL-MCNC: 3.8 MMOL/L — SIGNIFICANT CHANGE UP (ref 3.5–5.3)
POTASSIUM SERPL-SCNC: 3.5 MMOL/L — SIGNIFICANT CHANGE UP (ref 3.5–5.3)
POTASSIUM SERPL-SCNC: 3.8 MMOL/L — SIGNIFICANT CHANGE UP (ref 3.5–5.3)
PROT SERPL-MCNC: 5.5 G/DL — LOW (ref 6–8.3)
RBC # BLD: 4.29 M/UL — SIGNIFICANT CHANGE UP (ref 4.2–5.8)
RBC # BLD: 4.92 M/UL — SIGNIFICANT CHANGE UP (ref 4.2–5.8)
RBC # FLD: 11.7 % — SIGNIFICANT CHANGE UP (ref 10.3–14.5)
RBC # FLD: 13.1 % — SIGNIFICANT CHANGE UP (ref 10.3–14.5)
RBC BLD AUTO: NORMAL — SIGNIFICANT CHANGE UP
RBC BLD AUTO: NORMAL — SIGNIFICANT CHANGE UP
SODIUM SERPL-SCNC: 140 MMOL/L — SIGNIFICANT CHANGE UP (ref 135–145)
SODIUM SERPL-SCNC: 143 MMOL/L — SIGNIFICANT CHANGE UP (ref 135–145)
VARIANT LYMPHS # BLD: 11 % — HIGH (ref 0–6)
VARIANT LYMPHS # BLD: 12 % — HIGH (ref 0–6)
WBC # BLD: 10 K/UL — SIGNIFICANT CHANGE UP (ref 3.8–10.5)
WBC # BLD: 9.87 K/UL — SIGNIFICANT CHANGE UP (ref 3.8–10.5)
WBC # FLD AUTO: 10 K/UL — SIGNIFICANT CHANGE UP (ref 3.8–10.5)
WBC # FLD AUTO: 9.87 K/UL — SIGNIFICANT CHANGE UP (ref 3.8–10.5)

## 2018-02-14 PROCEDURE — 99233 SBSQ HOSP IP/OBS HIGH 50: CPT

## 2018-02-14 PROCEDURE — 99231 SBSQ HOSP IP/OBS SF/LOW 25: CPT | Mod: GC

## 2018-02-14 PROCEDURE — 76870 US EXAM SCROTUM: CPT | Mod: 26

## 2018-02-14 NOTE — CONSULT NOTE ADULT - ASSESSMENT
41 yo M w/ pmh of a benign salivary gland tumor s/p resection who presented with c/o nausea/vomiting, fatigue/generalized malaise and chills. Hematology consulted for abnormal differential of 33% bands and 5% blasts with normal WBC. Also found to be thrombocytopenic to 45.
42 year old man with history of benign salivary gland tumor s/p resection presented with 2 days of nausea and vomiting, found to have peripheral blasts, thrombocytopenia, and elevated transaminases. Bone marrow biopsy performed 2/13/18.     Impression:  1. Elevated transaminases - DDx: viral hepatitis (EBV, CMV, HSV), autoimmune hepatitis, infiltrative disease (lymphoma)  2. Thrombocytopenia  3. Peripheral blasts, bandemia    Plan:  - Check HAV total Ab, HBsAb, HEV Ab IgM, HBV DNA PCR  - Check HSV, EBV, CMV serologies  - Check ASMA, AMA, quantitative IgG, anti-LKM, alpha-1 AT, ceruloplasmin, iron studies, ferritin  - Follow daily CMP, INR, CBC  - Follow up Heme recs, BMBx  - Supportive care per primary team

## 2018-02-14 NOTE — CONSULT NOTE ADULT - SUBJECTIVE AND OBJECTIVE BOX
Chief Complaint:  Patient is a 42y old  Male who presents with a chief complaint of Nausea and vomiting (12 Feb 2018 03:58)      HPI: 42 year old Chinese man with history of benign salivary gland tumor s/p resection presented with 2 days of nausea and vomiting, found to have peripheral blasts, thrombocytopenia, and elevated transaminases.    He reports fatigue and chills that began 5 days prior to admission, and had a fever to 103F. He developed severe nausea and frequent, non-bloody emesis 2 days prior to admission. He was unable to tolerate PO diet. His symptoms persisted despite a trial of an anti-emetic.     Allergies:  No Known Allergies      Home Medications:    Hospital Medications:  influenza   Vaccine 0.5 milliLiter(s) IntraMuscular once  ondansetron Injectable 4 milliGRAM(s) IV Push every 8 hours PRN      PMHX/PSHX:  Salivary gland tumor  No significant past surgical history      Family history:  Family history of MI (myocardial infarction) (Mother)      Social History:     Review of systems: Negative, except as otherwise noted above      PHYSICAL EXAM:   Vital Signs:  Vital Signs Last 24 Hrs  T(C): 37.2 (14 Feb 2018 14:11), Max: 37.2 (14 Feb 2018 14:11)  T(F): 98.9 (14 Feb 2018 14:11), Max: 98.9 (14 Feb 2018 14:11)  HR: 60 (14 Feb 2018 14:11) (60 - 83)  BP: 129/80 (14 Feb 2018 14:11) (116/66 - 129/80)  BP(mean): --  RR: 18 (14 Feb 2018 14:11) (18 - 18)  SpO2: 97% (14 Feb 2018 14:11) (97% - 98%)  Daily     Daily     GENERAL:  No acute distress  HEENT:  Anicteric, no thrush  CHEST:  Non-labored breathing, lungs clear b/l  HEART:  +s1, s2 heart sounds, no murmurs  ABDOMEN:    EXTREMITIES:  warm and well perfused, no edema  SKIN:    NEURO:          LABS:  CBC Full  -  ( 13 Feb 2018 07:46 )  WBC Count : 7.96 K/uL  Hemoglobin : 13.1 g/dL  Hematocrit : 38.1 %  Platelet Count - Automated : 45 K/uL  Mean Cell Volume : 83.0 fl  Auto Neutrophil # : 2.32 K/uL  Auto Neutrophil % : 21.7 %    02-14 @ 09:15  Na 140 mmol/L  K 3.5 mmol/L  Cl 105 mmol/L  CO2 23 mmol/L  BUN 10 mg/dL  Creat 1.08 mg/dL  Glucose 88 mg/dL  Ca 8.0 mg/dL    Total protein 5.5 g/dL  Albumin 2.9 g/dL  T bili 0.4 mg/dL  Alk phos 109 U/L   U/L   U/L          Imaging: Chief Complaint:  Patient is a 42y old  Male who presents with a chief complaint of Nausea and vomiting (12 Feb 2018 03:58)      HPI: 42 year old Chinese man with history of benign salivary gland tumor s/p resection (2016) presented with 2 days of nausea and vomiting, found to have peripheral blasts, thrombocytopenia, and elevated transaminases.    He reports fatigue and chills that began 5 days prior to admission, and had a fever to 103F. He developed severe nausea and frequent vomiting 2 days prior to admission. He was unable to tolerate PO diet. His symptoms persisted despite a trial of ondansetron. He denies abdominal pain, diarrhea, rash, oral ulcers, jaundice. He took 3-4 tabs of Tylenol and Motrin for the 2 days prior to admission. Otherwise, he denies taking OTC medication, herbal preparations, supplements or vitamins. He reports rare alcohol use: 1-2 drinks per week and none recently. He denies tobacco or drug use. He reports travel to [place] 1 week ago, and travel to [place] 1 month ago. His last travel to [place] was in December 2017. [place]  in [place]. He has never had episodes of jaundice. He has never been told that he has abnormal liver tests, hepatitis, liver disease or cirrhosis. He has no known family history of hepatitis, cirrhosis. He has no family history of colon cancer, gastric cancer, leukemia or lymphoma. He has had no abdominal surgery. He has never had an EGD or colonoscopy.     Allergies:  No Known Allergies      Home Medications:  Zofran ODT:  (12 Feb 2018 04:08)      Hospital Medications:  influenza   Vaccine 0.5 milliLiter(s) IntraMuscular once  ondansetron Injectable 4 milliGRAM(s) IV Push every 8 hours PRN      PMHX/PSHX:  Salivary gland tumor  No significant past surgical history      Family history:  Family history of MI (myocardial infarction) (Mother)      Social History: As above    Review of systems: Negative, except as otherwise noted above      PHYSICAL EXAM:   Vital Signs:  Vital Signs Last 24 Hrs  T(C): 37.2 (14 Feb 2018 14:11), Max: 37.2 (14 Feb 2018 14:11)  T(F): 98.9 (14 Feb 2018 14:11), Max: 98.9 (14 Feb 2018 14:11)  HR: 60 (14 Feb 2018 14:11) (60 - 83)  BP: 129/80 (14 Feb 2018 14:11) (116/66 - 129/80)  BP(mean): --  RR: 18 (14 Feb 2018 14:11) (18 - 18)  SpO2: 97% (14 Feb 2018 14:11) (97% - 98%)  Daily     Daily     GENERAL:  No acute distress  HEENT:  Anicteric, no thrush  CHEST:  Non-labored breathing, lungs clear b/l, no cervical lymphadenopathy  HEART:  +s1, s2 heart sounds, no murmurs  ABDOMEN:  Soft, nontender, no rebound, no guarding, +bs  EXTREMITIES:  warm and well perfused, no edema  SKIN:  No rash or jaundice  NEURO:  AAOx3        LABS:  CBC Full  -  ( 13 Feb 2018 07:46 )  WBC Count : 7.96 K/uL  Hemoglobin : 13.1 g/dL  Hematocrit : 38.1 %  Platelet Count - Automated : 45 K/uL  Mean Cell Volume : 83.0 fl  Auto Neutrophil # : 2.32 K/uL  Auto Neutrophil % : 21.7 %    02-14 @ 09:15  Na 140 mmol/L  K 3.5 mmol/L  Cl 105 mmol/L  CO2 23 mmol/L  BUN 10 mg/dL  Creat 1.08 mg/dL  Glucose 88 mg/dL  Ca 8.0 mg/dL    Total protein 5.5 g/dL  Albumin 2.9 g/dL  T bili 0.4 mg/dL  Alk phos 109 U/L   U/L   U/L    02-13 @ 07:33  T bili 0.5 mg/dL  Alk phos 93 U/L   U/L   U/L    02-12 @ 02:00  T bili 1.1 mg/dL  Alk phos 108 U/L   U/L   U/L RC    SELWYN negative    HBcAb IgM negative  HBsAg negative  HAV Ab IgM negative  HCV Ab negative        Imaging:    < from: US Abdomen Complete (02.12.18 @ 13:45) >  FINDINGS:    Liver: Within normal limits.    Bile ducts: Normal caliber. Common bile duct measures 5 mm.     Gallbladder: Within normal limits.        Pancreas: Visualized portions are within normal limits.    Spleen: 10 cm. Within normal limits.    Right kidney: 12 cm. No hydronephrosis. Mild right perinephric fluid is   noted.    Left kidney: 11 cm.  No hydronephrosis.        Ascites: Mild right perinephric fluid.    Aorta and IVC: Visualized portions are within normal limits.    IMPRESSION:     Mild right perinephric fluid of uncertain etiology.    < end of copied text >    < from: CT Abdomen and Pelvis w/ IV Cont (02.13.18 @ 17:15) >  FINDINGS:    CHEST:     LUNGS AND LARGE AIRWAYS: Patent central airways. No pulmonary nodules.  PLEURA: No pleural effusion.  VESSELS: Within normal limits.   HEART: Heart size is normal. No pericardial effusion.  MEDIASTINUM AND SAURAV: No lymphadenopathy.  CHEST WALL AND LOWER NECK: Within normal limits.    ABDOMEN AND PELVIS:    LIVER: Within normal limits.  BILE DUCTS: Normal caliber.   GALLBLADDER: Within normal limits.  SPLEEN: Within normal limits.  PANCREAS: Within normal limits.  ADRENALS: Within normal limits.  KIDNEYS/URETERS: Mild right perinephric fluid and mild delayed right   nephrogram. No hydronephrosis of either kidney.     BLADDER: Within normal limits.  REPRODUCTIVE ORGANS: The prostate is enlarged.    BOWEL: No bowel obstruction. Normal appendix.    PERITONEUM: Small volume pelvic ascites.  VESSELS:  Within normal limits.  RETROPERITONEUM: Mild right perinephric fluid.    ABDOMINAL WALL: Within normal limits.  BONES: Within normal limits.    IMPRESSION: Mild right perinephric fluid and small volume pelvic ascites,   of uncertain etiology.    < end of copied text >

## 2018-02-14 NOTE — CONSULT NOTE ADULT - ATTENDING COMMENTS
42 M orig. from [place], Febrile illness, followed by nausea and vomiting, found elevated transaminases Max ,   on 2/14, also ferritin 2,00, sat 23%,and normal WBC, but 5% blasts. Today Creat increased to 1.21, ALT slightly higher to 311, AST down to 267, but clinical symptoms have resolved and he feels back to baseline. Took 2 days of Motrin and Tylenol initially.  Recently started drinking various teas at his office ([employer]), including green tea, but denies any tea from other sources. Takes VitD, but no other vitamins, herbs, or supplements. No sick contacts. Was in [place] in December. Lives with wife and two children. Hmeds: none.    BM biopsy: no malignancy; Workup negative for HBV, HCV, HAV, SELWYN ,ceruloplasmin, EBV IgM, HIV.  US: nromal liver, no dario. dil. CT: small pelvic ascites.    PE: healthy appearing, thin. No jaundice or lymphadenopathy. Conjunctivae and sclerae normal.  - likely acute viral illness or DILI - DILI may be due to Motrin that he took because of his fever. No other new substance, doupt that green tea he drank at [employer] caused this.  - ok to d/c home, will see him in my office in 1 week, and get labs tomorrow.
43 yo male p/w 4 dys fever/nausea/vomiting, initial labs with new onset thrombocytopenia and large granulocytic cells and blasts in peripheral blood. Pt has no prior hx of heme d/o, last clinical eval in Dec 2017 with nl cbc.   - agree with panCT to eval for adenopathy or organomegaly  - high LDH, abnl LFTs noted, US abdomen -findings benign  - concern for leukemic process given peripheral blood findings - initial pb smear nondiagnostic, await pb flow results (concern for T-LGL)  - recc BMbx 2/13 for further eval   - cont infectious w/u and management as per primary team  - will follow with you.

## 2018-02-15 ENCOUNTER — TRANSCRIPTION ENCOUNTER (OUTPATIENT)
Age: 43
End: 2018-02-15

## 2018-02-15 VITALS
TEMPERATURE: 98 F | DIASTOLIC BLOOD PRESSURE: 79 MMHG | RESPIRATION RATE: 18 BRPM | SYSTOLIC BLOOD PRESSURE: 124 MMHG | OXYGEN SATURATION: 99 % | HEART RATE: 61 BPM

## 2018-02-15 PROBLEM — Z00.00 ENCOUNTER FOR PREVENTIVE HEALTH EXAMINATION: Status: ACTIVE | Noted: 2018-02-15

## 2018-02-15 LAB
ALBUMIN SERPL ELPH-MCNC: 3 G/DL — LOW (ref 3.3–5)
ALP SERPL-CCNC: 119 U/L — SIGNIFICANT CHANGE UP (ref 40–120)
ALT FLD-CCNC: 311 U/L — HIGH (ref 10–45)
ANION GAP SERPL CALC-SCNC: 12 MMOL/L — SIGNIFICANT CHANGE UP (ref 5–17)
APTT BLD: 36.7 SEC — SIGNIFICANT CHANGE UP (ref 27.5–37.4)
AST SERPL-CCNC: 267 U/L — HIGH (ref 10–40)
BASOPHILS # BLD AUTO: 0.08 K/UL — SIGNIFICANT CHANGE UP (ref 0–0.2)
BASOPHILS NFR BLD AUTO: 1 % — SIGNIFICANT CHANGE UP (ref 0–2)
BILIRUB SERPL-MCNC: 0.4 MG/DL — SIGNIFICANT CHANGE UP (ref 0.2–1.2)
BUN SERPL-MCNC: 10 MG/DL — SIGNIFICANT CHANGE UP (ref 7–23)
CALCIUM SERPL-MCNC: 8 MG/DL — LOW (ref 8.4–10.5)
CERULOPLASMIN SERPL-MCNC: 25 MG/DL — SIGNIFICANT CHANGE UP (ref 20–60)
CHLORIDE SERPL-SCNC: 107 MMOL/L — SIGNIFICANT CHANGE UP (ref 96–108)
CO2 SERPL-SCNC: 25 MMOL/L — SIGNIFICANT CHANGE UP (ref 22–31)
CREAT SERPL-MCNC: 1.21 MG/DL — SIGNIFICANT CHANGE UP (ref 0.5–1.3)
DNA PLOIDY SPEC FC-IMP: SIGNIFICANT CHANGE UP
EBV EA AB SER IA-ACNC: 15.5 U/ML — HIGH
EBV EA AB TITR SER IF: POSITIVE
EBV EA IGG SER-ACNC: POSITIVE
EBV NA IGG SER IA-ACNC: 196 U/ML — HIGH
EBV PATRN SPEC IB-IMP: SIGNIFICANT CHANGE UP
EBV VCA IGG AVIDITY SER QL IA: POSITIVE
EBV VCA IGM SER IA-ACNC: 11.7 U/ML — SIGNIFICANT CHANGE UP
EBV VCA IGM SER IA-ACNC: 206 U/ML — HIGH
EBV VCA IGM TITR FLD: NEGATIVE — SIGNIFICANT CHANGE UP
EOSINOPHIL # BLD AUTO: 0.16 K/UL — SIGNIFICANT CHANGE UP (ref 0–0.5)
EOSINOPHIL NFR BLD AUTO: 2 % — SIGNIFICANT CHANGE UP (ref 0–6)
FERRITIN SERPL-MCNC: 2645 NG/ML — HIGH (ref 30–400)
GLUCOSE SERPL-MCNC: 90 MG/DL — SIGNIFICANT CHANGE UP (ref 70–99)
HAV IGG+IGM SER QL: REACTIVE
HBV DNA # SERPL NAA+PROBE: SIGNIFICANT CHANGE UP
HBV DNA SERPL NAA+PROBE-LOG#: SIGNIFICANT CHANGE UP LOGIU/ML
HBV SURFACE AB SER-ACNC: SIGNIFICANT CHANGE UP
HCT VFR BLD CALC: 36.5 % — LOW (ref 39–50)
HGB BLD-MCNC: 12.3 G/DL — LOW (ref 13–17)
HSV1 IGG SER-ACNC: 23.8 INDEX — HIGH
HSV1 IGG SERPL QL IA: POSITIVE
HSV2 IGG FLD-ACNC: 0.13 INDEX — SIGNIFICANT CHANGE UP
HSV2 IGG SERPL QL IA: NEGATIVE — SIGNIFICANT CHANGE UP
INR BLD: 0.89 RATIO — SIGNIFICANT CHANGE UP (ref 0.88–1.16)
IRON SATN MFR SERPL: 23 % — SIGNIFICANT CHANGE UP (ref 16–55)
IRON SATN MFR SERPL: 66 UG/DL — SIGNIFICANT CHANGE UP (ref 45–165)
LYMPHOCYTES # BLD AUTO: 2.43 K/UL — SIGNIFICANT CHANGE UP (ref 1–3.3)
LYMPHOCYTES # BLD AUTO: 30 % — SIGNIFICANT CHANGE UP (ref 13–44)
MANUAL SMEAR VERIFICATION: SIGNIFICANT CHANGE UP
MCHC RBC-ENTMCNC: 29.1 PG — SIGNIFICANT CHANGE UP (ref 27–34)
MCHC RBC-ENTMCNC: 33.7 GM/DL — SIGNIFICANT CHANGE UP (ref 32–36)
MCV RBC AUTO: 86.5 FL — SIGNIFICANT CHANGE UP (ref 80–100)
MITOCHONDRIA AB SER-ACNC: SIGNIFICANT CHANGE UP
MONOCYTES # BLD AUTO: 0.97 K/UL — HIGH (ref 0–0.9)
MONOCYTES NFR BLD AUTO: 12 % — SIGNIFICANT CHANGE UP (ref 2–14)
NEUTROPHILS # BLD AUTO: 3.41 K/UL — SIGNIFICANT CHANGE UP (ref 1.8–7.4)
NEUTROPHILS NFR BLD AUTO: 42 % — LOW (ref 43–77)
PLAT MORPH BLD: NORMAL — SIGNIFICANT CHANGE UP
PLATELET # BLD AUTO: 78 K/UL — LOW (ref 150–400)
POTASSIUM SERPL-MCNC: 3.7 MMOL/L — SIGNIFICANT CHANGE UP (ref 3.5–5.3)
POTASSIUM SERPL-SCNC: 3.7 MMOL/L — SIGNIFICANT CHANGE UP (ref 3.5–5.3)
PROT SERPL-MCNC: 6.1 G/DL — SIGNIFICANT CHANGE UP (ref 6–8.3)
PROTHROM AB SERPL-ACNC: 9.6 SEC — LOW (ref 9.8–12.7)
RBC # BLD: 4.22 M/UL — SIGNIFICANT CHANGE UP (ref 4.2–5.8)
RBC # FLD: 13.1 % — SIGNIFICANT CHANGE UP (ref 10.3–14.5)
RBC BLD AUTO: NORMAL — SIGNIFICANT CHANGE UP
SMOOTH MUSCLE AB SER-ACNC: ABNORMAL
SODIUM SERPL-SCNC: 144 MMOL/L — SIGNIFICANT CHANGE UP (ref 135–145)
TIBC SERPL-MCNC: 288 UG/DL — SIGNIFICANT CHANGE UP (ref 220–430)
TM INTERPRETATION: SIGNIFICANT CHANGE UP
UIBC SERPL-MCNC: 222 UG/DL — SIGNIFICANT CHANGE UP (ref 110–370)
VARIANT LYMPHS # BLD: 13 % — HIGH (ref 0–6)
WBC # BLD: 8.11 K/UL — SIGNIFICANT CHANGE UP (ref 3.8–10.5)
WBC # FLD AUTO: 8.11 K/UL — SIGNIFICANT CHANGE UP (ref 3.8–10.5)

## 2018-02-15 PROCEDURE — 83550 IRON BINDING TEST: CPT

## 2018-02-15 PROCEDURE — 80074 ACUTE HEPATITIS PANEL: CPT

## 2018-02-15 PROCEDURE — 99239 HOSP IP/OBS DSCHRG MGMT >30: CPT

## 2018-02-15 PROCEDURE — 88184 FLOWCYTOMETRY/ TC 1 MARKER: CPT

## 2018-02-15 PROCEDURE — 85384 FIBRINOGEN ACTIVITY: CPT

## 2018-02-15 PROCEDURE — 99231 SBSQ HOSP IP/OBS SF/LOW 25: CPT | Mod: GC

## 2018-02-15 PROCEDURE — 76870 US EXAM SCROTUM: CPT

## 2018-02-15 PROCEDURE — 93005 ELECTROCARDIOGRAM TRACING: CPT

## 2018-02-15 PROCEDURE — 85379 FIBRIN DEGRADATION QUANT: CPT

## 2018-02-15 PROCEDURE — 86696 HERPES SIMPLEX TYPE 2 TEST: CPT

## 2018-02-15 PROCEDURE — 85730 THROMBOPLASTIN TIME PARTIAL: CPT

## 2018-02-15 PROCEDURE — 71046 X-RAY EXAM CHEST 2 VIEWS: CPT

## 2018-02-15 PROCEDURE — 85097 BONE MARROW INTERPRETATION: CPT

## 2018-02-15 PROCEDURE — 83615 LACTATE (LD) (LDH) ENZYME: CPT

## 2018-02-15 PROCEDURE — 88312 SPECIAL STAINS GROUP 1: CPT

## 2018-02-15 PROCEDURE — 80048 BASIC METABOLIC PNL TOTAL CA: CPT

## 2018-02-15 PROCEDURE — 86706 HEP B SURFACE ANTIBODY: CPT

## 2018-02-15 PROCEDURE — 82306 VITAMIN D 25 HYDROXY: CPT

## 2018-02-15 PROCEDURE — 84100 ASSAY OF PHOSPHORUS: CPT

## 2018-02-15 PROCEDURE — 86663 EPSTEIN-BARR ANTIBODY: CPT

## 2018-02-15 PROCEDURE — 82310 ASSAY OF CALCIUM: CPT

## 2018-02-15 PROCEDURE — 88342 IMHCHEM/IMCYTCHM 1ST ANTB: CPT

## 2018-02-15 PROCEDURE — 82330 ASSAY OF CALCIUM: CPT

## 2018-02-15 PROCEDURE — 87389 HIV-1 AG W/HIV-1&-2 AB AG IA: CPT

## 2018-02-15 PROCEDURE — 87798 DETECT AGENT NOS DNA AMP: CPT

## 2018-02-15 PROCEDURE — 82390 ASSAY OF CERULOPLASMIN: CPT

## 2018-02-15 PROCEDURE — 74177 CT ABD & PELVIS W/CONTRAST: CPT

## 2018-02-15 PROCEDURE — 81342 TRG GENE REARRANGEMENT ANAL: CPT

## 2018-02-15 PROCEDURE — 87517 HEPATITIS B DNA QUANT: CPT

## 2018-02-15 PROCEDURE — 99285 EMERGENCY DEPT VISIT HI MDM: CPT | Mod: 25

## 2018-02-15 PROCEDURE — 86708 HEPATITIS A ANTIBODY: CPT

## 2018-02-15 PROCEDURE — 88280 CHROMOSOME KARYOTYPE STUDY: CPT

## 2018-02-15 PROCEDURE — 96374 THER/PROPH/DIAG INJ IV PUSH: CPT

## 2018-02-15 PROCEDURE — 81001 URINALYSIS AUTO W/SCOPE: CPT

## 2018-02-15 PROCEDURE — 83735 ASSAY OF MAGNESIUM: CPT

## 2018-02-15 PROCEDURE — 87086 URINE CULTURE/COLONY COUNT: CPT

## 2018-02-15 PROCEDURE — 87633 RESP VIRUS 12-25 TARGETS: CPT

## 2018-02-15 PROCEDURE — 80053 COMPREHEN METABOLIC PANEL: CPT

## 2018-02-15 PROCEDURE — 82728 ASSAY OF FERRITIN: CPT

## 2018-02-15 PROCEDURE — 82787 IGG 1 2 3 OR 4 EACH: CPT

## 2018-02-15 PROCEDURE — 88264 CHROMOSOME ANALYSIS 20-25: CPT

## 2018-02-15 PROCEDURE — 85610 PROTHROMBIN TIME: CPT

## 2018-02-15 PROCEDURE — 87486 CHLMYD PNEUM DNA AMP PROBE: CPT

## 2018-02-15 PROCEDURE — 86695 HERPES SIMPLEX TYPE 1 TEST: CPT

## 2018-02-15 PROCEDURE — 88341 IMHCHEM/IMCYTCHM EA ADD ANTB: CPT

## 2018-02-15 PROCEDURE — 86038 ANTINUCLEAR ANTIBODIES: CPT

## 2018-02-15 PROCEDURE — 99222 1ST HOSP IP/OBS MODERATE 55: CPT | Mod: GC

## 2018-02-15 PROCEDURE — 88313 SPECIAL STAINS GROUP 2: CPT

## 2018-02-15 PROCEDURE — 81340 TRB@ GENE REARRANGE AMPLIFY: CPT

## 2018-02-15 PROCEDURE — 86665 EPSTEIN-BARR CAPSID VCA: CPT

## 2018-02-15 PROCEDURE — 83970 ASSAY OF PARATHORMONE: CPT

## 2018-02-15 PROCEDURE — 88360 TUMOR IMMUNOHISTOCHEM/MANUAL: CPT

## 2018-02-15 PROCEDURE — 88237 TISSUE CULTURE BONE MARROW: CPT

## 2018-02-15 PROCEDURE — 86255 FLUORESCENT ANTIBODY SCREEN: CPT

## 2018-02-15 PROCEDURE — 71260 CT THORAX DX C+: CPT

## 2018-02-15 PROCEDURE — 86790 VIRUS ANTIBODY NOS: CPT

## 2018-02-15 PROCEDURE — 82652 VIT D 1 25-DIHYDROXY: CPT

## 2018-02-15 PROCEDURE — 86381 MITOCHONDRIAL ANTIBODY EACH: CPT

## 2018-02-15 PROCEDURE — 86664 EPSTEIN-BARR NUCLEAR ANTIGEN: CPT

## 2018-02-15 PROCEDURE — 87581 M.PNEUMON DNA AMP PROBE: CPT

## 2018-02-15 PROCEDURE — 88305 TISSUE EXAM BY PATHOLOGIST: CPT

## 2018-02-15 PROCEDURE — 85027 COMPLETE CBC AUTOMATED: CPT

## 2018-02-15 PROCEDURE — 76700 US EXAM ABDOM COMPLETE: CPT

## 2018-02-15 PROCEDURE — 87040 BLOOD CULTURE FOR BACTERIA: CPT

## 2018-02-15 PROCEDURE — 88185 FLOWCYTOMETRY/TC ADD-ON: CPT

## 2018-02-15 RX ORDER — ONDANSETRON 8 MG/1
0 TABLET, FILM COATED ORAL
Qty: 0 | Refills: 0 | COMMUNITY

## 2018-02-15 NOTE — DISCHARGE NOTE ADULT - CARE PLAN
Principal Discharge DX:	Viral illness  Goal:	To prevent dehydration & debilitation related to viral illness  Assessment and plan of treatment:	-Resolved, now tolerating regular diet  -Follow-up with your PCP within the next 1-2 weeks  Secondary Diagnosis:	Abnormal complete blood count  Assessment and plan of treatment:	-Normal WBC with 33% bandemia and 5% blasts on differential of peripheral blood, seen by Heme, underwent a bone marrow biopsy: preliminary results showing trilineage hematopoiesis, megakaryocytes, erythroid predominance, increased pronormoblasts, increased eosinophils, increased macrophages  -Follow-up with Hematology outpatient for final results  Secondary Diagnosis:	Transaminitis  Assessment and plan of treatment:	Elevated transaminases: + EBV & HSV on labs  - Pending official results of serologies ASMA, AMA, quantitative IgG, anti-LKM, alpha-1 AT, ceruloplasmin, iron studies, ferritin  Secondary Diagnosis:	Thrombocytopenia  Assessment and plan of treatment:	-Improving, likely 2/2 viral etiology  HOME CARE INSTRUCTIONS  Check the skin and linings inside your mouth for bruising or bleeding as directed by your caregiver.  Check your sputum, urine, and stool for blood as directed by your caregiver.  Do not return to any activities that could cause bumps or bruises until your caregiver says it is okay.  Take extra care not to cut yourself when shaving or when using scissors, needles, knives, and other tools.  Take extra care not to burn yourself when ironing or cooking.   Ask your caregiver if it is okay for you to drink alcohol.  Only take over-the-counter or prescription medicines as directed by your caregiver.  Notify all your caregivers, including dentists and eye doctors, about your condition.  SEEK IMMEDIATE MEDICAL CARE IF:  You develop active bleeding from anywhere in your body.   You develop unexplained bruising or bleeding.  You have blood in your sputum, urine, or stool Principal Discharge DX:	Viral illness  Goal:	To prevent dehydration & debilitation related to viral illness  Assessment and plan of treatment:	-Resolved, now tolerating regular diet  -Follow-up with your PCP within the next 1-2 weeks  Secondary Diagnosis:	Abnormal complete blood count  Assessment and plan of treatment:	-Normal WBC with 33% bandemia and 5% blasts on differential of peripheral blood, seen by Heme, underwent a bone marrow biopsy: preliminary results showing trilineage hematopoiesis, megakaryocytes, erythroid predominance, increased pronormoblasts, increased eosinophils, increased macrophages  -Follow-up with Hematology outpatient for final results  Secondary Diagnosis:	Transaminitis  Assessment and plan of treatment:	Elevated transaminases: + EBV & HSV on labs  - Pending official results of serologies ASMA, AMA, quantitative IgG, anti-LKM, alpha-1 AT, ceruloplasmin, iron studies, ferritin  -Follow up with Dr. Vann (Gastroenterology), next Friday 2/23/18 at 8am as scheduled at bedside.  Secondary Diagnosis:	Thrombocytopenia  Assessment and plan of treatment:	-Improving, likely 2/2 viral etiology  HOME CARE INSTRUCTIONS  Check the skin and linings inside your mouth for bruising or bleeding as directed by your caregiver.  Check your sputum, urine, and stool for blood as directed by your caregiver.  Do not return to any activities that could cause bumps or bruises until your caregiver says it is okay.  Take extra care not to cut yourself when shaving or when using scissors, needles, knives, and other tools.  Take extra care not to burn yourself when ironing or cooking.   Ask your caregiver if it is okay for you to drink alcohol.  Only take over-the-counter or prescription medicines as directed by your caregiver.  Notify all your caregivers, including dentists and eye doctors, about your condition.  SEEK IMMEDIATE MEDICAL CARE IF:  You develop active bleeding from anywhere in your body.   You develop unexplained bruising or bleeding.  You have blood in your sputum, urine, or stool

## 2018-02-15 NOTE — PROGRESS NOTE ADULT - PROBLEM SELECTOR PLAN 2
Patient noted to have a normal WBC with 33% bandemia and 5% blasts on differential of peripheral blood, concerning for a hematologic malignancy  -hematology consult placed
Patient noted to have a normal WBC with 33% bandemia and 5% blasts on differential of peripheral blood, concerning for a hematologic malignancy  -concern is for leukemia vs viral process  -F/U FISH, ?bone marrow biopsy? will clarify with heme  -CT A/P ordered   -SELWYN, HIV pending  -CBC and LDH daily
Patient noted to have a normal WBC with 33% bandemia and 5% blasts on differential of peripheral blood, concerning for a hematologic malignancy  -s/p BMB  -CT A/P shows mild right perinephric fluid, small volume ascites.   -testicular US unremarkable  -f/u with heme
Patient noted to have a normal WBC with 33% bandemia and 5% blasts on differential of peripheral blood, concerning for a hematologic malignancy  -s/p BMB - prelim report not suggestive of malignancy   -CT A/P shows mild right perinephric fluid, small volume ascites.   -testicular US unremarkable

## 2018-02-15 NOTE — PROGRESS NOTE ADULT - ASSESSMENT
41 yo M w/ pmh of a benign salivary gland tumor s/p resection who presented with c/o nausea/vomiting, fatigue/generalized malaise and chills. Found to have bandemia and blasts on automated diff with thrombocytopenia s/p BM biopsy on 2/13.
41 yo M w/ pmh of a benign salivary gland tumor s/p resection who presented with c/o nausea/vomiting, fatigue/generalized malaise and chills. Found to have bandemia and blasts on automated diff with thrombocytopenia s/p BM biopsy on 2/13.  Low suspicion for malignancy though can not rule out.  Likely viral etiology.
41 yo M w/ pmh of a benign salivary gland tumor s/p resection who presented with c/o nausea/vomiting, fatigue/generalized malaise and chills. Found to have bandemia and blasts on automated diff with thrombocytopenia.
41 yo M w/PMH of a benign salivary gland tumor s/p resection who presented with c/o nausea/vomiting found to have bandemia and blasts on peripheral smear, concerning for hematologic malignancy
41 yo M w/PMH of a benign salivary gland tumor s/p resection who presented with c/o nausea/vomiting found to have bandemia and blasts on peripheral smear, concerning for hematologic malignancy
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
41 yo M w/PMH of a benign salivary gland tumor s/p resection who presented with c/o nausea/vomiting found to have bandemia and blasts on peripheral smear, concerning for hematologic malignancy

## 2018-02-15 NOTE — DISCHARGE NOTE ADULT - CARE PROVIDERS DIRECT ADDRESSES
,DirectAddress_Unknown ,DirectAddress_Unknown,[email or web address]

## 2018-02-15 NOTE — PROGRESS NOTE ADULT - PROBLEM SELECTOR PLAN 4
-likely due to vomiting   -improving  -continue with IVF   -can d/c when tolerating PO intake, advance diet as tolerated today
-increasing transaminitis  -hep panel neg, CT A/P neg   -hepatology consult placed
-increasing transaminitis  -hep panel neg, CT A/P neg   -hepatology consult placed, autoimmune workup is pending
-resolved  -d/c IVF

## 2018-02-15 NOTE — DISCHARGE NOTE ADULT - HOSPITAL COURSE
Patient is a 43 yo M w/PMH of a benign salivary gland tumor s/p resection who presented with c/o nausea/vomiting with prodromal phase of   he developed fatigue/generalized malaise and chills prior to arrival. Initial CBC revealed normal white count, however noted 33% bands and 5% blasts. Thrombocytopenia to 45, hyponatremia to 126, hypokalemia to 3.4, hypocalcemia to 7.9 and transaminitis with AST/ALT of 117/117. UA notable for 100 protein and trace ketones. Infectious work-up negative in ED. Consulted by Hematology, serologies consistent with Mono, s/p bone marrow biopsy on 2/13/18, prelim results not indicative of malignancy however official read is pending. Hepatology consulted for transaminitis, with further work-up in progress.

## 2018-02-15 NOTE — PROGRESS NOTE ADULT - PROBLEM SELECTOR PLAN 3
Patient noted to be thrombocytopenic to a platelet count of 45 on initial CBC  -no evidence of bleeding  -hep panel pending  -f/u heme
Patient noted to be thrombocytopenic to a platelet count of 45 on initial CBC  -no evidence of bleeding  -continue to monitor
Patient noted to be thrombocytopenic to a platelet count of 45 on initial CBC  -no evidence of bleeding  -continue to monitor  -workup as above
Patient noted to be thrombocytopenic to a platelet count of 45 on initial CBC  -no evidence of bleeding  -improving

## 2018-02-15 NOTE — PROGRESS NOTE ADULT - SUBJECTIVE AND OBJECTIVE BOX
INTERVAL HPI/OVERNIGHT EVENTS:  Afebrile overnight. Denies bleeding.     VITAL SIGNS:  T(F): 98.1 (18 @ 16:51)  HR: 65 (18 @ 16:51)  BP: 119/81 (18 @ 16:51)  RR: 18 (18 @ 16:51)  SpO2: 98% (18 @ 16:51)  Wt(kg): --    PHYSICAL EXAM:    Constitutional: NAD  Eyes: EOMI, sclera non-icteric  Neck: supple, no masses, no JVD  Respiratory: CTA b/l, good air entry b/l  Cardiovascular: RRR, no M/R/G  Gastrointestinal: soft, NTND, no masses palpable, + BS, no hepatosplenomegaly  Extremities: no c/c/e  Neurological: AAOx3      MEDICATIONS  (STANDING):  influenza   Vaccine 0.5 milliLiter(s) IntraMuscular once  potassium phosphate IVPB 15 milliMole(s) IV Intermittent once  sodium chloride 0.9%. 1000 milliLiter(s) (100 mL/Hr) IV Continuous <Continuous>    MEDICATIONS  (PRN):  ondansetron Injectable 4 milliGRAM(s) IV Push every 8 hours PRN Nausea and/or Vomiting      Allergies    No Known Allergies    Intolerances        LABS:                        13.1   7.96  )-----------( 45       ( 2018 07:46 )             38.1     02-    141  |  105  |  8   ----------------------------<  96  3.6   |  23  |  0.85    Ca    7.6<L>      2018 07:33  Phos  1.4     02-12  Mg     1.6     -12    TPro  5.5<L>  /  Alb  2.8<L>  /  TBili  0.5  /  DBili  x   /  AST  162<H>  /  ALT  118<H>  /  AlkPhos  93  02-13    PT/INR - ( 2018 06:37 )   PT: 10.7 sec;   INR: 0.99 ratio         PTT - ( 2018 06:37 )  PTT:45.1 sec  Urinalysis Basic - ( 2018 03:20 )    Color: Yellow / Appearance: Clear / S.007 / pH: x  Gluc: x / Ketone: Trace  / Bili: Negative / Urobili: Negative   Blood: x / Protein: 100 mg/dL / Nitrite: Negative   Leuk Esterase: Negative / RBC: 0-2 /HPF / WBC 3-5 /HPF   Sq Epi: x / Non Sq Epi: x / Bacteria: x        RADIOLOGY & ADDITIONAL TESTS:  Studies reviewed.    < from: US Abdomen Complete (18 @ 13:45) >    FINDINGS:    Liver: Within normal limits.    Bile ducts: Normal caliber. Common bile duct measures 5 mm.     Gallbladder: Within normal limits.        Pancreas: Visualized portions are within normal limits.    Spleen: 10 cm. Within normal limits.    Right kidney: 12 cm. No hydronephrosis. Mild right perinephric fluid is   noted.    Left kidney: 11 cm.  No hydronephrosis.        Ascites: Mild right perinephric fluid.    Aorta and IVC: Visualized portions are within normal limits.    IMPRESSION:     Mild right perinephric fluid of uncertain etiology.    < end of copied text >
INTERVAL HPI/OVERNIGHT EVENTS:  Patient S&E at bedside. No o/n events, patient resting comfortably. No complaints at this time.     VITAL SIGNS:  T(F): 98.6 (02-14-18 @ 10:00)  HR: 74 (02-14-18 @ 10:00)  BP: 129/78 (02-14-18 @ 10:00)  RR: 18 (02-14-18 @ 10:00)  SpO2: 97% (02-14-18 @ 10:00)  Wt(kg): --    PHYSICAL EXAM:  Constitutional: NAD  Eyes: EOMI, sclera non-icteric  Neck: supple, no masses, no JVD  Respiratory: CTA b/l, good air entry b/l  Cardiovascular: RRR, no M/R/G  Gastrointestinal: soft, NTND, no masses palpable, + BS  Extremities: no c/c/e  Neurological: AAOx3  LAD: no peripheral LAD    MEDICATIONS  (STANDING):  influenza   Vaccine 0.5 milliLiter(s) IntraMuscular once    MEDICATIONS  (PRN):  ondansetron Injectable 4 milliGRAM(s) IV Push every 8 hours PRN Nausea and/or Vomiting    Allergies  No Known Allergies    LABS:                        13.1   7.96  )-----------( 45       ( 13 Feb 2018 07:46 )             38.1     02-14    140  |  105  |  10  ----------------------------<  88  3.5   |  23  |  1.08    Ca    8.0<L>      14 Feb 2018 09:15    TPro  5.5<L>  /  Alb  2.9<L>  /  TBili  0.4  /  DBili  x   /  AST  348<H>  /  ALT  285<H>  /  AlkPhos  109  02-14          RADIOLOGY & ADDITIONAL TESTS:  Studies reviewed.    ASSESSMENT & PLAN:
INTERVAL HPI/OVERNIGHT EVENTS:  Patient S&E at bedside. No o/n events, patient resting comfortably. No complaints at this time.  Afebrile for >48h.    VITAL SIGNS:  T(F): 97.8 (02-15-18 @ 10:00)  HR: 69 (02-15-18 @ 10:00)  BP: 123/76 (02-15-18 @ 10:00)  RR: 18 (02-15-18 @ 10:00)  SpO2: 97% (02-15-18 @ 10:00)  Wt(kg): --    PHYSICAL EXAM:  Constitutional: NAD  Eyes: EOMI, sclera non-icteric  Neck: supple, no masses, no JVD  Respiratory: CTA b/l, good air entry b/l  Cardiovascular: RRR, no M/R/G  Gastrointestinal: soft, NTND, no masses palpable, + BS, no hepatosplenomegaly  Extremities: no c/c/e  Neurological: AAOx3    MEDICATIONS  (STANDING):  influenza   Vaccine 0.5 milliLiter(s) IntraMuscular once    MEDICATIONS  (PRN):  ondansetron Injectable 4 milliGRAM(s) IV Push every 8 hours PRN Nausea and/or Vomiting    Allergies  No Known Allergies    LABS:                        12.3   8.11  )-----------( 78       ( 15 Feb 2018 09:21 )             36.5     02-15    144  |  107  |  10  ----------------------------<  90  3.7   |  25  |  1.21    Ca    8.0<L>      15 Feb 2018 09:25    TPro  6.1  /  Alb  3.0<L>  /  TBili  0.4  /  DBili  x   /  AST  267<H>  /  ALT  311<H>  /  AlkPhos  119  02-15    PTT - ( 15 Feb 2018 12:43 )  PTT:36.7 sec      RADIOLOGY & ADDITIONAL TESTS:  Studies reviewed.    ASSESSMENT & PLAN:
Patient is a 42y old  Male who presents with a chief complaint of Nausea and vomiting (12 Feb 2018 03:58)      SUBJECTIVE / OVERNIGHT EVENTS: no acute events overnight. Patient feels well, asking to go home today.     MEDICATIONS  (STANDING):  influenza   Vaccine 0.5 milliLiter(s) IntraMuscular once    MEDICATIONS  (PRN):  ondansetron Injectable 4 milliGRAM(s) IV Push every 8 hours PRN Nausea and/or Vomiting      Vital Signs Last 24 Hrs  T(C): 37.1 (15 Feb 2018 14:03), Max: 37.1 (15 Feb 2018 14:03)  T(F): 98.7 (15 Feb 2018 14:03), Max: 98.7 (15 Feb 2018 14:03)  HR: 71 (15 Feb 2018 14:03) (60 - 71)  BP: 117/77 (15 Feb 2018 14:03) (101/63 - 123/76)  BP(mean): --  RR: 18 (15 Feb 2018 14:03) (18 - 18)  SpO2: 99% (15 Feb 2018 14:03) (96% - 99%)  CAPILLARY BLOOD GLUCOSE        I&O's Summary    14 Feb 2018 07:01  -  15 Feb 2018 07:00  --------------------------------------------------------  IN: 960 mL / OUT: 4175 mL / NET: -3215 mL    15 Feb 2018 07:01  -  15 Feb 2018 15:06  --------------------------------------------------------  IN: 240 mL / OUT: 1150 mL / NET: -910 mL        PHYSICAL EXAM:  GENERAL: NAD, well-developed  HEAD:  Atraumatic, Normocephalic  EYES: EOMI, PERRLA, conjunctiva and sclera clear  NECK: Supple, No JVD  CHEST/LUNG: Clear to auscultation bilaterally; No wheeze  HEART: Regular rate and rhythm; No murmurs  ABDOMEN: Soft, Nontender, Nondistended; Bowel sounds present  EXTREMITIES:  2+ Peripheral Pulses, No clubbing, cyanosis, or edema  PSYCH: AAOx3  NEUROLOGY: non-focal  SKIN: No rashes or lesions    LABS:                        12.3   8.11  )-----------( 78       ( 15 Feb 2018 09:21 )             36.5     02-15    144  |  107  |  10  ----------------------------<  90  3.7   |  25  |  1.21    Ca    8.0<L>      15 Feb 2018 09:25    TPro  6.1  /  Alb  3.0<L>  /  TBili  0.4  /  DBili  x   /  AST  267<H>  /  ALT  311<H>  /  AlkPhos  119  02-15    PT/INR - ( 15 Feb 2018 12:43 )   PT: 9.6 sec;   INR: 0.89 ratio         PTT - ( 15 Feb 2018 12:43 )  PTT:36.7 sec          Consultant(s) Notes Reviewed:  Heme Onc, Hepatology    Care Discussed with Consultants/Other Providers: Heme Onc Fellow Priti
Patient is a 42y old  Male who presents with a chief complaint of Nausea and vomiting (12 Feb 2018 03:58)      SUBJECTIVE / OVERNIGHT EVENTS: no acute events overnight. Patient feels well.     MEDICATIONS  (STANDING):  influenza   Vaccine 0.5 milliLiter(s) IntraMuscular once    MEDICATIONS  (PRN):  ondansetron Injectable 4 milliGRAM(s) IV Push every 8 hours PRN Nausea and/or Vomiting      Vital Signs Last 24 Hrs  T(C): 37.2 (14 Feb 2018 14:11), Max: 37.2 (14 Feb 2018 14:11)  T(F): 98.9 (14 Feb 2018 14:11), Max: 98.9 (14 Feb 2018 14:11)  HR: 60 (14 Feb 2018 14:11) (60 - 83)  BP: 129/80 (14 Feb 2018 14:11) (116/66 - 129/80)  BP(mean): --  RR: 18 (14 Feb 2018 14:11) (18 - 18)  SpO2: 97% (14 Feb 2018 14:11) (97% - 98%)  CAPILLARY BLOOD GLUCOSE        I&O's Summary    13 Feb 2018 07:01  -  14 Feb 2018 07:00  --------------------------------------------------------  IN: 1660 mL / OUT: 2300 mL / NET: -640 mL    14 Feb 2018 07:01  -  14 Feb 2018 16:51  --------------------------------------------------------  IN: 480 mL / OUT: 1725 mL / NET: -1245 mL        PHYSICAL EXAM:  GENERAL: NAD, resting comfortably   HEAD:  Atraumatic, Normocephalic  EYES: EOMI  NECK: Supple  CHEST/LUNG: Clear to auscultation bilaterally; No wheeze  HEART: Regular rate and rhythm; No murmurs  ABDOMEN: Soft, Nontender, Nondistended; Bowel sounds present  EXTREMITIES:  2+ Peripheral Pulses, No clubbing, cyanosis, or edema  PSYCH: AAOx3  NEUROLOGY: non-focal      LABS:                        13.1   7.96  )-----------( 45       ( 13 Feb 2018 07:46 )             38.1     02-14    140  |  105  |  10  ----------------------------<  88  3.5   |  23  |  1.08    Ca    8.0<L>      14 Feb 2018 09:15    TPro  5.5<L>  /  Alb  2.9<L>  /  TBili  0.4  /  DBili  x   /  AST  348<H>  /  ALT  285<H>  /  AlkPhos  109  02-14              Consultant(s) Notes Reviewed:  Heme Onc
Patient is a 42y old  Male who presents with a chief complaint of Nausea and vomiting (2018 03:58)      SUBJECTIVE / OVERNIGHT EVENTS: no acute events overnight, nausea improved. Tolerating regular meals. Had breakfast this morning. Feeling better overall.     MEDICATIONS  (STANDING):  influenza   Vaccine 0.5 milliLiter(s) IntraMuscular once  lidocaine 2% Injectable 30 milliLiter(s) Local Injection once  potassium phosphate IVPB 15 milliMole(s) IV Intermittent once  sodium chloride 0.9%. 1000 milliLiter(s) (100 mL/Hr) IV Continuous <Continuous>    MEDICATIONS  (PRN):  ondansetron Injectable 4 milliGRAM(s) IV Push every 8 hours PRN Nausea and/or Vomiting      Vital Signs Last 24 Hrs  T(C): 36.7 (2018 10:33), Max: 37.4 (2018 05:12)  T(F): 98.1 (2018 10:33), Max: 99.4 (2018 05:12)  HR: 85 (2018 10:) (77 - 102)  BP: 117/79 (2018 10:33) (115/78 - 128/82)  BP(mean): --  RR: 18 (2018 10:33) (18 - 18)  SpO2: 98% (2018 10:33) (98% - 100%)  CAPILLARY BLOOD GLUCOSE        I&O's Summary    2018 07:  -  2018 07:00  --------------------------------------------------------  IN: 2180 mL / OUT: 2550 mL / NET: -370 mL    2018 07:  -  2018 12:59  --------------------------------------------------------  IN: 0 mL / OUT: 400 mL / NET: -400 mL        PHYSICAL EXAM:  GENERAL: sitting up in chair, eating breakfast   HEAD:  Atraumatic, Normocephalic  EYES: EOMI, conjunctiva and sclera clear  NECK: Supple, No JVD  CHEST/LUNG: Clear to auscultation bilaterally; No wheeze  HEART: Regular rate and rhythm; No murmurs  ABDOMEN: Soft, Nontender, Nondistended; Bowel sounds present  EXTREMITIES:  2+ Peripheral Pulses, No clubbing, cyanosis, or edema  PSYCH: AAOx3  NEUROLOGY: non-focal    LABS:                        13.1   7.96  )-----------( 45       ( 2018 07:46 )             38.1     02-    141  |  105  |  8   ----------------------------<  96  3.6   |  23  |  0.85    Ca    7.6<L>      2018 07:33  Phos  1.4     02-12  Mg     1.6     12    TPro  5.5<L>  /  Alb  2.8<L>  /  TBili  0.5  /  DBili  x   /  AST  162<H>  /  ALT  118<H>  /  AlkPhos  93  02-13    PT/INR - ( 2018 06:37 )   PT: 10.7 sec;   INR: 0.99 ratio         PTT - ( 2018 06:37 )  PTT:45.1 sec      Urinalysis Basic - ( 2018 03:20 )    Color: Yellow / Appearance: Clear / S.007 / pH: x  Gluc: x / Ketone: Trace  / Bili: Negative / Urobili: Negative   Blood: x / Protein: 100 mg/dL / Nitrite: Negative   Leuk Esterase: Negative / RBC: 0-2 /HPF / WBC 3-5 /HPF   Sq Epi: x / Non Sq Epi: x / Bacteria: x          Consultant(s) Notes Reviewed:  	Heme-Onc
Patient is a 42y old  Male who presents with a chief complaint of Nausea and vomiting (2018 03:58)      SUBJECTIVE / OVERNIGHT EVENTS: patient was nauseous overnight, but has an appetite this morning. Has not vomited so far. Asking if he can eat bread. He reports feeling fatigued and run down, has had flu like symptoms over the past few days.       MEDICATIONS  (STANDING):  sodium chloride 0.9%. 1000 milliLiter(s) (100 mL/Hr) IV Continuous <Continuous>    MEDICATIONS  (PRN):  ondansetron Injectable 4 milliGRAM(s) IV Push every 8 hours PRN Nausea and/or Vomiting      Vital Signs Last 24 Hrs  T(C): 37 (2018 11:03), Max: 37.7 (2018 00:05)  T(F): 98.6 (2018 11:03), Max: 99.9 (2018 00:05)  HR: 93 (2018 11:03) (91 - 118)  BP: 124/85 (2018 11:03) (117/79 - 131/88)  BP(mean): --  RR: 17 (2018 11:03) (16 - 18)  SpO2: 99% (2018 11:03) (99% - 100%)  CAPILLARY BLOOD GLUCOSE        I&O's Summary      PHYSICAL EXAM:  GENERAL: NAD, well-developed  HEAD:  Atraumatic, Normocephalic  EYES: EOMI, conjunctiva and sclera clear  NECK: Supple, No JVD  CHEST/LUNG: Clear to auscultation bilaterally; No wheeze  HEART: Regular rate and rhythm; No murmurs  ABDOMEN: Soft, Nontender, Nondistended; Bowel sounds present  EXTREMITIES:  2+ Peripheral Pulses, No clubbing, cyanosis, or edema  PSYCH: AAOx3  NEUROLOGY: non-focal      LABS:                        13.3   5.68  )-----------( 40       ( 2018 07:38 )             37.4     02-12    130<L>  |  94<L>  |  7   ----------------------------<  118<H>  3.5   |  22  |  1.04    Ca    7.0<L>      2018 07:52  Phos  1.4     -  Mg     1.6         TPro  7.1  /  Alb  3.7  /  TBili  1.1  /  DBili  x   /  AST  117<H>  /  ALT  117<H>  /  AlkPhos  108      PT/INR - ( 2018 06:37 )   PT: 10.7 sec;   INR: 0.99 ratio         PTT - ( 2018 06:37 )  PTT:45.1 sec      Urinalysis Basic - ( 2018 03:20 )    Color: Yellow / Appearance: Clear / S.007 / pH: x  Gluc: x / Ketone: Trace  / Bili: Negative / Urobili: Negative   Blood: x / Protein: 100 mg/dL / Nitrite: Negative   Leuk Esterase: Negative / RBC: 0-2 /HPF / WBC 3-5 /HPF   Sq Epi: x / Non Sq Epi: x / Bacteria: x

## 2018-02-15 NOTE — PROGRESS NOTE ADULT - PROBLEM SELECTOR PLAN 5
Patient with transaminitis on presentation with AST//117  - this may be related to dehydration/sepsis verus other infection  - hep panel negative  - repeat CMP
Patient with transaminitis on presentation with AST//117  - hep panel negative  - no biliary obstruction/abdominal complaints  - f/u CT
SCDs in the setting of thrombocytopenia  regular diet
SCDs in the setting of thrombocytopenia  regular diet

## 2018-02-15 NOTE — PROGRESS NOTE ADULT - ATTENDING COMMENTS
pt clinically improved. LFTS trending back toward nl, plt ct recovering,   + EBV and HSV titers  pb flow and prelim BMBx eval negative for malignancy  agree with d/c today  outpt heme/onc f/u for labs and final Bmbx results.
pt doing well, fever improved, no e/o organomegaly on US of abd.  bmbx done today, pt tolerated procedure well  await pb flow   await CT imaging  check ebv, htlv-1  will cont to follow
pt feels better eager for dc  transaminitis on labs progressively worse  await flow cytometry to r/o malignant process  repeat cbc for plt trend  smear - no clumps, no schistocytes, occasional teardrop rbcs, atypical granular lymphs  CT c/a/p - benign findings, no adenopathy  US testes - wnl  suspect viral etiology for clinical picture
Discussed with heme team, no further inpatient workup. Serologies consistent with Mono, prelim bone marrow biopsy not indicative of malignancy however official read is pending. Will touch base with hepatology as well. LFTs remain elevated. If no further inpatient workup, can be discharged home today.       More than 45 minutes spent coordinating care.

## 2018-02-15 NOTE — PROGRESS NOTE ADULT - PROBLEM SELECTOR PLAN 1
-vomiting has resolved, nausea improved  -patient has appetite   -advance diet as tolerated
-resolved  -tolerating regular diet  -will d/c IVF
CBC with diff daily, today's results pending  flow cytometry - nondiagnostic  bone marrow biopsy done on 2/13, results pending  CT chest/abd/pelvis - significant for mild right perinephric fluid, small volume ascites of unknown etiology  US testicular done, results pending  awaiting viral serologies HTLV-1, EBV; HIV and hepatitis panel neg    Jennifer Marcos  Hematology Fellow  [phone]
awaiting final flow cytometry results  prelim flow cytometry doesn't show blasts  bone marrow biopsy today  would check viral serologies HTLV-1, EBV  will follow CT chest/abd/pelvis
plt improving, today 78  flow cytometry from peripheral blood - nondiagnostic  bone marrow biopsy done on 2/13, preliminary results showing trilineage hematopoiesis, megakaryocytes, erythroid predominance, increased pronormoblasts, increased eosinophils, increased macrophages  CT chest/abd/pelvis - significant for mild right perinephric fluid, small volume ascites of unknown etiology  US testicular neg  EBV IgG+, IgM-, HSV IgG+, Hep A total +, awaiting viral serologies HTLV-1  HIV and hepatitis panel neg  Appreciate hepatology recs - autoimmune w/u pending    Jennifer Marcos  Hematology Fellow  [phone]

## 2018-02-15 NOTE — DISCHARGE NOTE ADULT - PLAN OF CARE
To prevent dehydration & debilitation related to viral illness -Resolved, now tolerating regular diet  -Follow-up with your PCP within the next 1-2 weeks -Normal WBC with 33% bandemia and 5% blasts on differential of peripheral blood, seen by Heme, underwent a bone marrow biopsy: preliminary results showing trilineage hematopoiesis, megakaryocytes, erythroid predominance, increased pronormoblasts, increased eosinophils, increased macrophages  -Follow-up with Hematology outpatient for final results Elevated transaminases: + EBV & HSV on labs  - Pending official results of serologies ASMA, AMA, quantitative IgG, anti-LKM, alpha-1 AT, ceruloplasmin, iron studies, ferritin -Improving, likely 2/2 viral etiology  HOME CARE INSTRUCTIONS  Check the skin and linings inside your mouth for bruising or bleeding as directed by your caregiver.  Check your sputum, urine, and stool for blood as directed by your caregiver.  Do not return to any activities that could cause bumps or bruises until your caregiver says it is okay.  Take extra care not to cut yourself when shaving or when using scissors, needles, knives, and other tools.  Take extra care not to burn yourself when ironing or cooking.   Ask your caregiver if it is okay for you to drink alcohol.  Only take over-the-counter or prescription medicines as directed by your caregiver.  Notify all your caregivers, including dentists and eye doctors, about your condition.  SEEK IMMEDIATE MEDICAL CARE IF:  You develop active bleeding from anywhere in your body.   You develop unexplained bruising or bleeding.  You have blood in your sputum, urine, or stool Elevated transaminases: + EBV & HSV on labs  - Pending official results of serologies ASMA, AMA, quantitative IgG, anti-LKM, alpha-1 AT, ceruloplasmin, iron studies, ferritin  -Follow up with Dr. Vann (Gastroenterology), next Friday 2/23/18 at 8am as scheduled at bedside.

## 2018-02-15 NOTE — PROGRESS NOTE ADULT - NSHPATTENDINGPLANDISCUSS_GEN_ALL_CORE
Dr Marcos and heme/onc team
Dr. Guzman and heme/onc team
Dr. Marcos and heme/onc team
medicine JACKSON Charlton
medicine NOEL Charlton, patient
medicine JACKSON Charlton

## 2018-02-15 NOTE — PROGRESS NOTE ADULT - PROBLEM SELECTOR PROBLEM 1
Abnormal blood finding
Nausea & vomiting

## 2018-02-15 NOTE — DISCHARGE NOTE ADULT - CARE PROVIDER_API CALL
William Bower), Internal Medicine  [address] [place] [address]  Phone: [phone]  Fax: [phone] William Bower), Internal Medicine  [address] [place] [address]  Phone: [phone]  Fax: [phone]    Gaurav Tripathi), Hematology; Internal Medicine; Medical Oncology  [address] [place] [address]  Phone: [phone]  Fax: [phone]

## 2018-02-15 NOTE — DISCHARGE NOTE ADULT - ADDITIONAL INSTRUCTIONS
Follow-up with Dr. Vann (Gastroenterology) next Friday 2/23/18 at 8am to complete work-up. Follow-up with Dr. Vann (Gastroenterology) next Friday 2/23/18 at 8am to complete work-up.  Follow-up with ANY Hematologist at specialty office-address and phone number listed below

## 2018-02-15 NOTE — DISCHARGE NOTE ADULT - PATIENT PORTAL LINK FT
You can access the [facility] Patient Portal, offered by [facility], by registering with the following website: [email or web address]

## 2018-02-15 NOTE — PROGRESS NOTE ADULT - PROBLEM SELECTOR PROBLEM 2
Abnormal complete blood count

## 2018-02-16 ENCOUNTER — OTHER (OUTPATIENT)
Age: 43
End: 2018-02-16

## 2018-02-16 LAB
HEMATOPATHOLOGY REPORT: SIGNIFICANT CHANGE UP
HTLV I+II AB PATRN SER RIPA-IMP: SIGNIFICANT CHANGE UP

## 2018-02-17 LAB
CMV DNA CSF QL NAA+PROBE: SIGNIFICANT CHANGE UP
CULTURE RESULTS: SIGNIFICANT CHANGE UP
CULTURE RESULTS: SIGNIFICANT CHANGE UP
IGG SERPL-MCNC: 906 MG/DL — SIGNIFICANT CHANGE UP (ref 700–1600)
IGG1 SER-MCNC: 515 MG/DL — SIGNIFICANT CHANGE UP (ref 248–810)
IGG2 SER-MCNC: 424 MG/DL — SIGNIFICANT CHANGE UP (ref 130–555)
IGG3 SER-MCNC: 69 MG/DL — SIGNIFICANT CHANGE UP (ref 15–102)
IGG4 SER-MCNC: 60 MG/DL — SIGNIFICANT CHANGE UP (ref 2–96)
SPECIMEN SOURCE: SIGNIFICANT CHANGE UP
SPECIMEN SOURCE: SIGNIFICANT CHANGE UP

## 2018-02-22 LAB — HEV IGM SER QL: ABNORMAL

## 2018-02-23 ENCOUNTER — APPOINTMENT (OUTPATIENT)
Age: 43
End: 2018-02-23
Payer: COMMERCIAL

## 2018-02-23 ENCOUNTER — LABORATORY RESULT (OUTPATIENT)
Age: 43
End: 2018-02-23

## 2018-02-23 VITALS
SYSTOLIC BLOOD PRESSURE: 114 MMHG | WEIGHT: 130 LBS | TEMPERATURE: 97.3 F | HEIGHT: 68 IN | RESPIRATION RATE: 17 BRPM | BODY MASS INDEX: 19.7 KG/M2 | HEART RATE: 78 BPM | DIASTOLIC BLOOD PRESSURE: 79 MMHG

## 2018-02-23 DIAGNOSIS — Z78.9 OTHER SPECIFIED HEALTH STATUS: ICD-10-CM

## 2018-02-23 DIAGNOSIS — R94.5 ABNORMAL RESULTS OF LIVER FUNCTION STUDIES: ICD-10-CM

## 2018-02-23 DIAGNOSIS — E87.1 HYPO-OSMOLALITY AND HYPONATREMIA: ICD-10-CM

## 2018-02-23 DIAGNOSIS — Z92.89 PERSONAL HISTORY OF OTHER MEDICAL TREATMENT: ICD-10-CM

## 2018-02-23 DIAGNOSIS — R18.8 OTHER ASCITES: ICD-10-CM

## 2018-02-23 LAB
ALBUMIN SERPL ELPH-MCNC: 4.1 G/DL
ALP BLD-CCNC: 98 U/L
ALT SERPL-CCNC: 69 U/L
ANION GAP SERPL CALC-SCNC: 14 MMOL/L
AST SERPL-CCNC: 28 U/L
BILIRUB SERPL-MCNC: 0.6 MG/DL
BUN SERPL-MCNC: 13 MG/DL
CALCIUM SERPL-MCNC: 9.4 MG/DL
CHLORIDE SERPL-SCNC: 104 MMOL/L
CO2 SERPL-SCNC: 25 MMOL/L
CREAT SERPL-MCNC: 1.13 MG/DL
GLUCOSE SERPL-MCNC: 87 MG/DL
INR PPP: 1 RATIO
POTASSIUM SERPL-SCNC: 3.9 MMOL/L
PROT SERPL-MCNC: 7.9 G/DL
PT BLD: 11.3 SEC
SODIUM SERPL-SCNC: 143 MMOL/L

## 2018-02-23 PROCEDURE — 99204 OFFICE O/P NEW MOD 45 MIN: CPT

## 2018-02-23 NOTE — HISTORY OF PRESENT ILLNESS
[Needlestick Exposure] : no needlestick exposure [Infected Sexual Partner] : no infected sexual partner [IV Drug Use] : no IV drug use [Tattoo] : no tattoos [Body Piercing] : no body piercing [Hemodialysis] : no hemodialysis [Transfusion before 1992] : no transfusion before 1992 [Transplant before 1992] : no transplant before 1992 [Incarceration] : no incarceration [Alcohol Abuse] : no alcohol abuse [Autoimmune Disorder] : no autoimmune disorder [Household Contact to HBV] : no household contact to HBV [Travel to Endemic Area] : no travel to an endemic area [Occupational Exposure] : no occupational exposure [Cocaine Use] : no cocaine use [de-identified] : Mr. Graf is a 43 yo man, orig. from [place], h/o resected benign salivary tumor, who was recently admitted with a febrile illness with left shif and elevated AST/ALT up to 348/285.\par Wed chills, Thu very sick, Fri fever - urgent care: started taking Tylenol and Motrin, Sat: N/V mult. times, Sun: took anti-nausea medication, dehydrated, went to ER. Got IV, felt much better within a few hours.\par He had 2d N/V unable to tolerate PO, fever 103, fatigue. X 5d, found 33% bands, 5% blasts, thrombocytopenia, elev. AST//117 on admission. Took 3 Tylenol and Motrin x 2d before admission. He drinks various teas at his office including green tea - he is an  at [employer]. Denies any tea at home. No herbs/OTC drugs, rare alcohol. Travels: 1 week prior to [place], 1 month prior to [place], in 12/2017 to [facility]. Never EGD/colonoscopy.\par \par Course: WBC 7.0 (max 10), blasts 5%, 33% bands,thrombocytopenia PLT 45 -> now 78. Hponatremia Na 126\par LFTs: AST/ALT 1.1, 117/117 (on 2/14: 348\par •	BM biopsy negative (nonspecific changes)\par •	Max. Transam. 348/285 on 2/14/18, 267/311 on 2/15/18 (discharge)\par •	Neg: AMA <1:20, ASMA <1:20, HCV Ab, HBsAg, cIgM, EBV IgM, IgG 906, SELWYN, iron 66, TIBC 288, %sat 23, ceruloplasmin 25, HAV IgM, CVM PCR, \par •	Positive: ferritin 2645, HEV-IgM; HAV-Ig total, \par \par Bone marrow 2/13/18: mild changes only.\par Flow cytometry: no diagnostic changes\par

## 2018-02-23 NOTE — PHYSICAL EXAM
[General Appearance - Alert] : alert [General Appearance - In No Acute Distress] : in no acute distress [Sclera] : the sclera and conjunctiva were normal [PERRL With Normal Accommodation] : pupils were equal in size, round, and reactive to light [Extraocular Movements] : extraocular movements were intact [Outer Ear] : the ears and nose were normal in appearance [Oropharynx] : the oropharynx was normal [Neck Appearance] : the appearance of the neck was normal [Neck Cervical Mass (___cm)] : no neck mass was observed [Jugular Venous Distention Increased] : there was no jugular-venous distention [Thyroid Diffuse Enlargement] : the thyroid was not enlarged [Thyroid Nodule] : there were no palpable thyroid nodules [Auscultation Breath Sounds / Voice Sounds] : lungs were clear to auscultation bilaterally [Heart Rate And Rhythm] : heart rate was normal and rhythm regular [Heart Sounds] : normal S1 and S2 [Heart Sounds Gallop] : no gallops [Murmurs] : no murmurs [Heart Sounds Pericardial Friction Rub] : no pericardial rub [Full Pulse] : the pedal pulses are present [Edema] : there was no peripheral edema [Breast Appearance] : normal in appearance [Breast Palpation Mass] : no palpable masses [Bowel Sounds] : normal bowel sounds [Abdomen Soft] : soft [Abdomen Tenderness] : non-tender [Abdomen Mass (___ Cm)] : no abdominal mass palpated [Normal Sphincter Tone] : normal sphincter tone [No Rectal Mass] : no rectal mass [No CVA Tenderness] : no ~M costovertebral angle tenderness [No Spinal Tenderness] : no spinal tenderness [Abnormal Walk] : normal gait [Nail Clubbing] : no clubbing  or cyanosis of the fingernails [Musculoskeletal - Swelling] : no joint swelling seen [Motor Tone] : muscle strength and tone were normal [Skin Color & Pigmentation] : normal skin color and pigmentation [Skin Turgor] : normal skin turgor [] : no rash [No Focal Deficits] : no focal deficits [Oriented To Time, Place, And Person] : oriented to person, place, and time [Impaired Insight] : insight and judgment were intact [Affect] : the affect was normal [Abdominal  Ascites] : no ascites [Splenomegaly] : no splenomegaly [Asterixis] : no asterixis observed [Jaundice] : No jaundice [Palmar Erythema] : no Palmar Erythema [Depression] : no depression [Occult Blood Positive] : stool was negative for occult blood

## 2018-02-23 NOTE — REASON FOR VISIT
[Initial Evaluation] : an initial evaluation [FreeTextEntry1] : recent hospitalization with elevated LFTs

## 2018-02-23 NOTE — ASSESSMENT
[FreeTextEntry1] : - acute hepatitis E with elevated transaminases to 350, thrombocytopenia, hyponatremia, left shif with 33% bands and 5% blasts, ferritin 2,600, and small pelvic ascites requiring recent hospitalization as above, now feels back to normal; negative workup for HepB, C   (no PCRs), AIH, PBC, PSC.\par - repeat CBC w/ differential, CMP, INR, ferritin, get HEV RT PCR, HBsAb\par

## 2018-02-26 ENCOUNTER — TRANSCRIPTION ENCOUNTER (OUTPATIENT)
Age: 43
End: 2018-02-26

## 2018-02-26 LAB
BASOPHILS # BLD AUTO: 0 K/UL
BASOPHILS NFR BLD AUTO: 0 %
EOSINOPHIL # BLD AUTO: 0.11 K/UL
EOSINOPHIL NFR BLD AUTO: 1.8
FERRITIN SERPL-MCNC: 486 NG/ML
HBV SURFACE AB SER QL: NONREACTIVE
HCT VFR BLD CALC: 43.9 %
HGB BLD-MCNC: 13.6 G/DL
HSV1 IGM SER QL: NORMAL TITER
HSV2 AB FLD-ACNC: NORMAL TITER
LYMPHOCYTES # BLD AUTO: 1.75 K/UL
LYMPHOCYTES NFR BLD AUTO: 28.6 %
MAN DIFF?: NORMAL
MCHC RBC-ENTMCNC: 28.2 PG
MCHC RBC-ENTMCNC: 31 GM/DL
MCV RBC AUTO: 91.1 FL
MONOCYTES # BLD AUTO: 0.65 K/UL
MONOCYTES NFR BLD AUTO: 10.7 %
NEUTROPHILS # BLD AUTO: 3.27 K/UL
NEUTROPHILS NFR BLD AUTO: 53.5 %
PLATELET # BLD AUTO: 373 K/UL
RBC # BLD: 4.82 M/UL
RBC # FLD: 14.4 %
WBC # FLD AUTO: 6.11 K/UL

## 2018-03-06 LAB
MISCELLANEOUS TEST: NORMAL
PROC NAME: NORMAL

## 2018-03-22 ENCOUNTER — OUTPATIENT (OUTPATIENT)
Dept: OUTPATIENT SERVICES | Facility: HOSPITAL | Age: 43
LOS: 1 days | Discharge: HOME | End: 2018-03-22

## 2018-03-22 DIAGNOSIS — D69.6 THROMBOCYTOPENIA, UNSPECIFIED: ICD-10-CM

## 2018-03-26 ENCOUNTER — RESULT REVIEW (OUTPATIENT)
Age: 43
End: 2018-03-26

## 2018-03-26 ENCOUNTER — APPOINTMENT (OUTPATIENT)
Dept: HEMATOLOGY ONCOLOGY | Facility: CLINIC | Age: 43
End: 2018-03-26
Payer: MEDICARE

## 2018-03-26 VITALS
DIASTOLIC BLOOD PRESSURE: 80 MMHG | WEIGHT: 134.48 LBS | TEMPERATURE: 98 F | SYSTOLIC BLOOD PRESSURE: 112 MMHG | HEART RATE: 63 BPM | BODY MASS INDEX: 20.38 KG/M2 | OXYGEN SATURATION: 100 % | HEIGHT: 68.11 IN | RESPIRATION RATE: 17 BRPM

## 2018-03-26 DIAGNOSIS — Z86.2 PERSONAL HISTORY OF DISEASES OF THE BLOOD AND BLOOD-FORMING ORGANS AND CERTAIN DISORDERS INVOLVING THE IMMUNE MECHANISM: ICD-10-CM

## 2018-03-26 DIAGNOSIS — R74.0 NONSPECIFIC ELEVATION OF LEVELS OF TRANSAMINASE AND LACTIC ACID DEHYDROGENASE [LDH]: ICD-10-CM

## 2018-03-26 DIAGNOSIS — B17.2 ACUTE HEPATITIS E: ICD-10-CM

## 2018-03-26 LAB
BASOPHILS # BLD AUTO: 0.1 K/UL — SIGNIFICANT CHANGE UP (ref 0–0.2)
BASOPHILS NFR BLD AUTO: 1.1 % — SIGNIFICANT CHANGE UP (ref 0–2)
EOSINOPHIL # BLD AUTO: 0.3 K/UL — SIGNIFICANT CHANGE UP (ref 0–0.5)
EOSINOPHIL NFR BLD AUTO: 4.9 % — SIGNIFICANT CHANGE UP (ref 0–6)
HCT VFR BLD CALC: 42 % — SIGNIFICANT CHANGE UP (ref 39–50)
HGB BLD-MCNC: 14 G/DL — SIGNIFICANT CHANGE UP (ref 13–17)
LYMPHOCYTES # BLD AUTO: 1.8 K/UL — SIGNIFICANT CHANGE UP (ref 1–3.3)
LYMPHOCYTES # BLD AUTO: 28.2 % — SIGNIFICANT CHANGE UP (ref 13–44)
MCHC RBC-ENTMCNC: 29.5 PG — SIGNIFICANT CHANGE UP (ref 27–34)
MCHC RBC-ENTMCNC: 33.4 G/DL — SIGNIFICANT CHANGE UP (ref 32–36)
MCV RBC AUTO: 88.4 FL — SIGNIFICANT CHANGE UP (ref 80–100)
MONOCYTES # BLD AUTO: 0.5 K/UL — SIGNIFICANT CHANGE UP (ref 0–0.9)
MONOCYTES NFR BLD AUTO: 7.2 % — SIGNIFICANT CHANGE UP (ref 2–14)
NEUTROPHILS # BLD AUTO: 3.7 K/UL — SIGNIFICANT CHANGE UP (ref 1.8–7.4)
NEUTROPHILS NFR BLD AUTO: 58.6 % — SIGNIFICANT CHANGE UP (ref 43–77)
PLATELET # BLD AUTO: 184 K/UL — SIGNIFICANT CHANGE UP (ref 150–400)
RBC # BLD: 4.75 M/UL — SIGNIFICANT CHANGE UP (ref 4.2–5.8)
RBC # FLD: 12.1 % — SIGNIFICANT CHANGE UP (ref 10.3–14.5)
WBC # BLD: 6.4 K/UL — SIGNIFICANT CHANGE UP (ref 3.8–10.5)
WBC # FLD AUTO: 6.4 K/UL — SIGNIFICANT CHANGE UP (ref 3.8–10.5)

## 2018-03-26 PROCEDURE — 99214 OFFICE O/P EST MOD 30 MIN: CPT

## 2018-04-04 DIAGNOSIS — R19.8 OTHER SPECIFIED SYMPTOMS AND SIGNS INVOLVING THE DIGESTIVE SYSTEM AND ABDOMEN: Chronic | ICD-10-CM

## 2019-06-09 ENCOUNTER — TRANSCRIPTION ENCOUNTER (OUTPATIENT)
Age: 44
End: 2019-06-09

## 2020-01-20 ENCOUNTER — TRANSCRIPTION ENCOUNTER (OUTPATIENT)
Age: 45
End: 2020-01-20

## 2021-06-15 ENCOUNTER — TRANSCRIPTION ENCOUNTER (OUTPATIENT)
Age: 46
End: 2021-06-15

## 2022-12-07 ENCOUNTER — NON-APPOINTMENT (OUTPATIENT)
Age: 47
End: 2022-12-07

## 2023-09-19 PROBLEM — D49.0 NEOPLASM OF UNSPECIFIED BEHAVIOR OF DIGESTIVE SYSTEM: Chronic | Status: ACTIVE | Noted: 2018-02-12

## 2023-12-07 ENCOUNTER — APPOINTMENT (OUTPATIENT)
Dept: GASTROENTEROLOGY | Facility: CLINIC | Age: 48
End: 2023-12-07
Payer: COMMERCIAL

## 2023-12-07 VITALS
TEMPERATURE: 98 F | SYSTOLIC BLOOD PRESSURE: 123 MMHG | WEIGHT: 132 LBS | HEIGHT: 68.11 IN | BODY MASS INDEX: 20 KG/M2 | HEART RATE: 62 BPM | OXYGEN SATURATION: 99 % | DIASTOLIC BLOOD PRESSURE: 81 MMHG

## 2023-12-07 DIAGNOSIS — Z12.11 ENCOUNTER FOR SCREENING FOR MALIGNANT NEOPLASM OF COLON: ICD-10-CM

## 2023-12-07 PROCEDURE — 99213 OFFICE O/P EST LOW 20 MIN: CPT

## 2023-12-12 ENCOUNTER — APPOINTMENT (OUTPATIENT)
Dept: GASTROENTEROLOGY | Facility: CLINIC | Age: 48
End: 2023-12-12
Payer: COMMERCIAL

## 2023-12-12 PROCEDURE — 45378 DIAGNOSTIC COLONOSCOPY: CPT

## 2025-03-04 ENCOUNTER — NON-APPOINTMENT (OUTPATIENT)
Age: 50
End: 2025-03-04